# Patient Record
Sex: FEMALE | Race: WHITE | NOT HISPANIC OR LATINO | Employment: UNEMPLOYED | URBAN - METROPOLITAN AREA
[De-identification: names, ages, dates, MRNs, and addresses within clinical notes are randomized per-mention and may not be internally consistent; named-entity substitution may affect disease eponyms.]

---

## 2021-02-15 ENCOUNTER — OFFICE VISIT (OUTPATIENT)
Dept: AUDIOLOGY | Facility: CLINIC | Age: 66
End: 2021-02-15
Payer: MEDICARE

## 2021-02-15 DIAGNOSIS — R42 DIZZINESS: Primary | ICD-10-CM

## 2021-02-15 PROCEDURE — 92540 BASIC VESTIBULAR EVALUATION: CPT | Performed by: AUDIOLOGIST

## 2021-02-15 PROCEDURE — 92538 CALORIC VSTBLR TEST W/REC: CPT | Performed by: AUDIOLOGIST

## 2021-02-15 NOTE — PROGRESS NOTES
Videonystagmography (VNG) Evaluation    Name:  Bárbara Head  :  1955  Age:  78 y o  Date of Evaluation: 02/15/21     History: Dizziness  Reason for visit: Bárbara Amor is seen today at the request of Dr Trent Vergara for an evaluation of balance  Patient complains of on going dizziness that initially began ~1 year ago  Patient reports getting dizzy when getting up from a seated position or when walking  Patient describes her dizziness as if she is falling to the side and will need to grab onto something  She reports this typically lasts for several seconds at a time  Denies any true spinning vertigo  Patient denies any falls or head injuries/concussions/whiplash  Patient does report constant sinus related issues; denies any recent ear infections  Patient denies any fluctuations/changes with her hearing sensitives, tinnitus, or aural fullness  Patient reports her vision has worsened over the years and notes her last opthalmology appointment was in 2020  Patient denies any congenital diseases or nystagmus in her family  Medical history includes ovarian cancer in   Oculomotor battery:    Gaze:          Right: Normal        Left: Normal         Up: Normal        Down: Normal      Saccades: Normal     Tracking: Abnormal choppy tracings; square wave jerks present  Optokinetic: Abnormal Gain    Positioning/Positionals:     Rand Keron:    Right: Negative  , Up beating nystagmus present 3 a SPV  No torsion  Left: Negative  , Right beating nystagmus present 9 a SPV  No torsion  Clinically significant        Positionals:     Sitting: Normal    Supine: Abnormal Right Beat 3 degree and Clinically Not Significant    Head Right: Abnormal Left Beat 8 degree and Clinically Significant     Head Left: Abnormal Right Beat 9 degree and Clinically Significant     Body Right: Abnormal Left Beat 5 degree and Clinically Significant     Body Left[de-identified] Abnormal Right Beat 9 degree and Clinically Significant Calorics:     Bithermal Caloric Irrigation: Normal    Notes:  Abnormal central findings  Abnormal peripheral findings       Recommendations:  Consider vestibular physcial therapy evaluation and rehabilitation through [facility]  Continue medical work-up  Follow up with managing physician  Consider Neurological work-up        Francia Krishnamurthy , CCC-A  Clinical Audiologist

## 2021-04-18 NOTE — PROGRESS NOTES
Assessment and Plan:   Ms Mino Palacios is a 58-year-old female with history significant for primary generalized osteoarthritis and cervical degenerative arthritis, who presents for further evaluation of her joint pains  She is self-referred today  - Adma presents today for further evaluation of diffuse arthralgias she has been experiencing for the past 19 years following a course of chemotherapy for uterine cancer  She does not describe prominent inflammatory symptoms such as persistent joint swelling or prolonged morning stiffness and based on her examination today I have low suspicion for an inflammatory arthritis and suspect that her presentation is consistent with primary generalized osteoarthritis  I advised her in this case she will need to continue with Tylenol or NSAIDs as needed and follow up with Orthopedics as well as Pain Management to specifically address the left knee osteoarthritis and cervical degenerative arthritis, respectively  - She mentions having an inflammatory workup done in the past through her prior rheumatologist which was apparently unrevealing, but I would like to reestablish this on my own and will complete a workup as listed below  I will contact her with the results and determine if a rheumatology follow-up is indicated  Otherwise she is comfortable continuing follow-up with her primary care physician for the osteoarthritis  - She is requesting a baseline DEXA scan as this has not been done in a few years and she is postmenopausal   I provided her with a script today  Plan:  Diagnoses and all orders for this visit:    Diffuse arthralgia  -     CBC and differential; Future  -     Chronic Hepatitis Panel; Future  -     Comprehensive metabolic panel; Future  -     C-reactive protein; Future  -     Cyclic citrul peptide antibody, IgG; Future  -     RF Screen w/ Reflex to Titer; Future  -     Sedimentation rate, automated; Future  -     Sjogren's Antibodies;  Future  - Uric acid; Future  -     XR hand 3+ vw right; Future  -     XR hand 3+ vw left; Future  -     XR shoulder 2+ vw right; Future  -     XR shoulder 2+ vw left; Future    Primary generalized (osteo)arthritis    Osteoarthritis of cervical spine, unspecified spinal osteoarthritis complication status  -     Ambulatory referral to Pain Management; Future    Vitamin D deficiency  -     Vitamin D 25 hydroxy; Future    Postmenopausal  -     DXA bone density spine hip and pelvis; Future    Osteoporosis screening  -     DXA bone density spine hip and pelvis; Future    Encounter for screening for other viral diseases   -     Chronic Hepatitis Panel; Future    Other orders  -     Cancel: TSH, 3rd generation; Future  -     aspirin 81 mg chewable tablet; Chew 81 mg daily  -     meloxicam (MOBIC) 7 5 mg tablet; Take 7 5 mg by mouth daily  -     metFORMIN (GLUCOPHAGE) 500 mg tablet; Take 1 tablet by mouth 2 (two) times a day  -     metoprolol succinate (TOPROL-XL) 25 mg 24 hr tablet; Take 1 tablet by mouth daily  -     omeprazole (PriLOSEC) 20 mg delayed release capsule; TAKE 1 CAPSULE BY MOUTH EVERY DAY 30 MINUTES BEFORE MORNING MEAL  -     [employer] Saline Nasal [place] 0 65 % nasal spray; 2 SPRAYS EACH NARES 3 TIMES A DAY  -     triamcinolone (KENALOG) 0 1 % oral topical paste; Apply 1 application to teeth 2 (two) times a day  -     OneTouch Verio test strip; TEST BLOOD SUGAR ONCE DAILY, DX  E11 9      Activities as tolerated  Exercise: try to maintain a low impact exercise regimen as much as possible  Walk for 30 minutes a day for at least 3 days a week  Continue other medications as prescribed by PCP and other specialists  RTC PRN  HPI  Ms Damaris Nieves is a 25-year-old female with history significant for primary generalized osteoarthritis and cervical degenerative arthritis, who presents for further evaluation of her joint pains  She is self-referred today        Patient reports she has previously seen Rheumatology in [place] and had been monitored for a few years with follow-up blood work and x-rays, but there was no specific diagnosis offered to her  She has not been on any specific treatment either  She reports her joint pains started approximately in 2001 after completing a course of chemotherapy for uterine cancer  They have gradually progressed since then with additional joint involvement occurring over time  She describes most of her pain as affecting her hands, elbows, neck and shoulder region especially over the past 1 year, low back, outer aspects of her bilateral hips, knees (worse on the left side) and feet  No significant pain in her wrists or ankles  She has noticed swelling of her hands  She experiences morning stiffness at times that affects her hands and can take a few days to improve, usually resolving with intermittent fasting  She has tried over-the-counter Tylenol and NSAIDs as needed which does help to some degree but she has to take it at high doses  She was recently seen by Orthopedics for left knee osteoarthritis and prescribed a 1 week course of meloxicam which only helped about 3%  She is also known to have a diagnosis of cervical degenerative arthritis and recently had an MRI of her cervical spine done  She was referred to Neurology for this but no further recommendations were made  She has not been seen by Spine and Pain  She does report left eye dryness  She denies fevers, unintentional weight loss, persistent headaches (may experience this occasionally due to allergies), recent vision changes, jaw claudication, dry mouth, inflammatory eye disease, skin rash, psoriasis, inflammatory bowel disease or family history of autoimmune disease  Her brother has osteoarthritis      The following portions of the patient's history were reviewed and updated as appropriate: allergies, current medications, past family history, past medical history, past social history, past surgical history and problem list       Review of Systems  Constitutional: Negative for weight change, fevers, chills, night sweats, fatigue  ENT/Mouth: Negative for hearing changes, ear pain, nasal congestion, sinus pain, hoarseness, sore throat, rhinorrhea, swallowing difficulty  Eyes: Negative for pain, redness, discharge, vision changes  Cardiovascular: Negative for chest pain, SOB, palpitations  Respiratory: Negative for cough, sputum, wheezing, dyspnea  Gastrointestinal: Negative for nausea, vomiting, diarrhea, constipation, pain, heartburn  Genitourinary: Negative for dysuria, urinary frequency, hematuria  Musculoskeletal: As per HPI  Skin: Negative for skin rash, color changes  Neuro: Negative for weakness, numbness, tingling, loss of consciousness  Psych: Negative for anxiety, depression  Heme/Lymph: Negative for easy bruising, bleeding, lymphadenopathy          Past Medical History:   Diagnosis Date    Cancer [facility])        Past Surgical History:   Procedure Laterality Date    HYSTERECTOMY         Social History     Socioeconomic History    Marital status: Single     Spouse name: Not on file    Number of children: Not on file    Years of education: Not on file    Highest education level: Not on file   Occupational History    Not on file   Social Needs    Financial resource strain: Not on file    Food insecurity     Worry: Not on file     Inability: Not on file    Transportation needs     Medical: Not on file     Non-medical: Not on file   Tobacco Use    Smoking status: Never Smoker    Smokeless tobacco: Never Used   Substance and Sexual Activity    Alcohol use: Yes     Comment: occasional    Drug use: Never    Sexual activity: Not on file   Lifestyle    Physical activity     Days per week: Not on file     Minutes per session: Not on file    Stress: Not on file   Relationships    Social connections     Talks on phone: Not on file     Gets together: Not on file     Attends [employer] service: Not on file     Active member of club or organization: Not on file     Attends meetings of clubs or organizations: Not on file     Relationship status: Not on file    Intimate partner violence     Fear of current or ex partner: Not on file     Emotionally abused: Not on file     Physically abused: Not on file     Forced sexual activity: Not on file   Other Topics Concern    Not on file   Social History Narrative    Not on file       Family History   Problem Relation Age of Onset    Heart disease Mother     Lung disease Father     Heart disease Brother     Lung disease Brother        Allergies   Allergen Reactions    Shellfish Allergy - Food Allergy Other (See Comments)    Sulfa Antibiotics Other (See Comments)       Current Outpatient Medications:     CVS Saline Nasal [place] 0 65 % nasal spray, 2 SPRAYS EACH NARES 3 TIMES A DAY, Disp: , Rfl:     metFORMIN (GLUCOPHAGE) 500 mg tablet, Take 1 tablet by mouth 2 (two) times a day, Disp: , Rfl:     aspirin 81 mg chewable tablet, Chew 81 mg daily, Disp: , Rfl:     meloxicam (MOBIC) 7 5 mg tablet, Take 7 5 mg by mouth daily, Disp: , Rfl:     metoprolol succinate (TOPROL-XL) 25 mg 24 hr tablet, Take 1 tablet by mouth daily, Disp: , Rfl:     omeprazole (PriLOSEC) 20 mg delayed release capsule, TAKE 1 CAPSULE BY MOUTH EVERY DAY 30 MINUTES BEFORE MORNING MEAL, Disp: , Rfl:     OneTouch Verio test strip, TEST BLOOD SUGAR ONCE DAILY, DX  E11 9, Disp: , Rfl:     triamcinolone (KENALOG) 0 1 % oral topical paste, Apply 1 application to teeth 2 (two) times a day, Disp: , Rfl:       Objective:    Vitals:    04/19/21 1253   BP: 147/83   BP Location: Left arm   Patient Position: Sitting   Cuff Size: Adult   Pulse: 85   Temp: 98 4 °F (36 9 °C)   Weight: 63 5 kg (140 lb)       Physical Exam  General: Well appearing, well nourished, in no distress  Oriented x 3, normal mood and affect  Ambulating without difficulty    Skin: Good turgor, no rash, unusual bruising or prominent lesions  Hair: Normal texture and distribution  Nails: Normal color, no deformities  HEENT:  Head: Normocephalic, atraumatic  Eyes: Conjunctiva clear, sclera non-icteric, EOM intact  Extremities: No amputations or deformities, cyanosis, edema  Prominent lower extremity varicose veins noted  Musculoskeletal:   Hands - she has significant osteoarthritic changes present at her bilateral DIP and PIP joints but there is no soft tissue swelling or tenderness noted  Her MCPs are unremarkable  She does have significant bony enlargement noted at multiple digits bilaterally with an overall enlarged appearance to her fingers  She is unable to fully extend at her right hand 3rd and 4th digits  Wrists, elbows and shoulders - unremarkable  Knees, ankles and feet - unremarkable except for bunion deformities noted  Negative fibromyalgia tender points  Neurologic: Alert and oriented  No focal neurological deficits appreciated  Psychiatric: Normal mood and affect  KENNETH Marte    Rheumatology

## 2021-04-19 ENCOUNTER — LAB (OUTPATIENT)
Dept: LAB | Facility: HOSPITAL | Age: 66
End: 2021-04-19
Attending: INTERNAL MEDICINE
Payer: MEDICARE

## 2021-04-19 ENCOUNTER — HOSPITAL ENCOUNTER (OUTPATIENT)
Dept: RADIOLOGY | Facility: HOSPITAL | Age: 66
Discharge: HOME | End: 2021-04-19
Attending: INTERNAL MEDICINE
Payer: MEDICARE

## 2021-04-19 ENCOUNTER — TRANSCRIBE ORDERS (OUTPATIENT)
Dept: ADMINISTRATIVE | Facility: HOSPITAL | Age: 66
End: 2021-04-19

## 2021-04-19 ENCOUNTER — OFFICE VISIT (OUTPATIENT)
Dept: RHEUMATOLOGY | Facility: CLINIC | Age: 66
End: 2021-04-19
Payer: MEDICARE

## 2021-04-19 VITALS
SYSTOLIC BLOOD PRESSURE: 147 MMHG | WEIGHT: 140 LBS | DIASTOLIC BLOOD PRESSURE: 83 MMHG | HEART RATE: 85 BPM | TEMPERATURE: 98.4 F

## 2021-04-19 DIAGNOSIS — M25.50 DIFFUSE ARTHRALGIA: Primary | ICD-10-CM

## 2021-04-19 DIAGNOSIS — M25.50 DIFFUSE ARTHRALGIA: ICD-10-CM

## 2021-04-19 DIAGNOSIS — E55.9 VITAMIN D DEFICIENCY: ICD-10-CM

## 2021-04-19 DIAGNOSIS — Z78.0 POSTMENOPAUSAL: ICD-10-CM

## 2021-04-19 DIAGNOSIS — Z11.59 ENCOUNTER FOR SCREENING FOR OTHER VIRAL DISEASES: ICD-10-CM

## 2021-04-19 DIAGNOSIS — Z13.820 OSTEOPOROSIS SCREENING: ICD-10-CM

## 2021-04-19 DIAGNOSIS — M47.812 OSTEOARTHRITIS OF CERVICAL SPINE, UNSPECIFIED SPINAL OSTEOARTHRITIS COMPLICATION STATUS: ICD-10-CM

## 2021-04-19 DIAGNOSIS — M15.0 PRIMARY GENERALIZED (OSTEO)ARTHRITIS: ICD-10-CM

## 2021-04-19 PROCEDURE — 99204 OFFICE O/P NEW MOD 45 MIN: CPT | Performed by: INTERNAL MEDICINE

## 2021-04-19 PROCEDURE — 73130 X-RAY EXAM OF HAND: CPT

## 2021-04-19 PROCEDURE — 73030 X-RAY EXAM OF SHOULDER: CPT

## 2021-04-19 RX ORDER — ASPIRIN 81 MG/1
81 TABLET, CHEWABLE ORAL DAILY
COMMUNITY

## 2021-04-19 RX ORDER — MELOXICAM 7.5 MG/1
7.5 TABLET ORAL DAILY
COMMUNITY
Start: 2021-04-05 | End: 2021-05-05

## 2021-04-19 RX ORDER — TRIAMCINOLONE ACETONIDE 0.1 %
1 PASTE (GRAM) DENTAL 2 TIMES DAILY
COMMUNITY

## 2021-04-19 RX ORDER — METOPROLOL SUCCINATE 25 MG/1
1 TABLET, EXTENDED RELEASE ORAL DAILY
COMMUNITY

## 2021-04-19 RX ORDER — OMEPRAZOLE 20 MG/1
CAPSULE, DELAYED RELEASE ORAL
COMMUNITY
Start: 2021-02-06

## 2021-04-19 RX ORDER — SODIUM CHLORIDE 0.65 %
AEROSOL, SPRAY (ML) NASAL
COMMUNITY
Start: 2021-03-17

## 2021-04-19 RX ORDER — BLOOD SUGAR DIAGNOSTIC
STRIP MISCELLANEOUS
COMMUNITY
Start: 2021-02-09

## 2021-04-21 ENCOUNTER — TELEPHONE (OUTPATIENT)
Dept: OBGYN CLINIC | Facility: HOSPITAL | Age: 66
End: 2021-04-21

## 2021-04-21 NOTE — TELEPHONE ENCOUNTER
Dr Yolette Arzola cover the Hepatitis and Vitamin D bloodwork  The doctor has to code it differently showing that the test is necessary        # [phone]

## 2021-04-21 NOTE — TELEPHONE ENCOUNTER
Please look for code for screening hepatitis for the hepatitis test and for Vitamin D deficiency for the Vitamin D test, thanks

## 2021-04-29 ENCOUNTER — HOSPITAL ENCOUNTER (OUTPATIENT)
Dept: RADIOLOGY | Facility: HOSPITAL | Age: 66
Discharge: HOME | End: 2021-04-29
Attending: INTERNAL MEDICINE
Payer: MEDICARE

## 2021-04-29 ENCOUNTER — TRANSCRIBE ORDERS (OUTPATIENT)
Dept: ADMINISTRATIVE | Facility: HOSPITAL | Age: 66
End: 2021-04-29

## 2021-04-29 ENCOUNTER — APPOINTMENT (OUTPATIENT)
Dept: LAB | Facility: HOSPITAL | Age: 66
End: 2021-04-29
Payer: MEDICARE

## 2021-04-29 DIAGNOSIS — Z13.820 OSTEOPOROSIS SCREENING: ICD-10-CM

## 2021-04-29 DIAGNOSIS — Z78.0 POSTMENOPAUSAL: ICD-10-CM

## 2021-04-29 LAB
25(OH)D3 SERPL-MCNC: >135 NG/ML (ref 30–100)
ALBUMIN SERPL BCP-MCNC: 3.9 G/DL (ref 3.5–5)
ALP SERPL-CCNC: 91 U/L (ref 46–116)
ALT SERPL W P-5'-P-CCNC: 31 U/L (ref 12–78)
ANION GAP SERPL CALCULATED.3IONS-SCNC: 9 MMOL/L (ref 4–13)
AST SERPL W P-5'-P-CCNC: 19 U/L (ref 5–45)
BASOPHILS # BLD AUTO: 0.01 THOUSANDS/ΜL (ref 0–0.1)
BASOPHILS NFR BLD AUTO: 0 % (ref 0–1)
BILIRUB SERPL-MCNC: 0.38 MG/DL (ref 0.2–1)
BUN SERPL-MCNC: 19 MG/DL (ref 5–25)
CALCIUM SERPL-MCNC: 8.4 MG/DL (ref 8.3–10.1)
CHLORIDE SERPL-SCNC: 105 MMOL/L (ref 100–108)
CO2 SERPL-SCNC: 29 MMOL/L (ref 21–32)
CREAT SERPL-MCNC: 0.68 MG/DL (ref 0.6–1.3)
CRP SERPL QL: <0.5 MG/L
EOSINOPHIL # BLD AUTO: 0.21 THOUSAND/ΜL (ref 0–0.61)
EOSINOPHIL NFR BLD AUTO: 3 % (ref 0–6)
ERYTHROCYTE [DISTWIDTH] IN BLOOD BY AUTOMATED COUNT: 12.8 % (ref 11.6–15.1)
ERYTHROCYTE [SEDIMENTATION RATE] IN BLOOD: 10 MM/HOUR (ref 0–29)
GFR SERPL CREATININE-BSD FRML MDRD: 91 ML/MIN/1.73SQ M
GLUCOSE SERPL-MCNC: 117 MG/DL (ref 65–140)
HBV CORE AB SER QL: NORMAL
HBV CORE IGM SER QL: NORMAL
HBV SURFACE AG SER QL: NORMAL
HCT VFR BLD AUTO: 38.1 % (ref 34.8–46.1)
HCV AB SER QL: NORMAL
HGB BLD-MCNC: 12.9 G/DL (ref 11.5–15.4)
IMM GRANULOCYTES # BLD AUTO: 0.01 THOUSAND/UL (ref 0–0.2)
IMM GRANULOCYTES NFR BLD AUTO: 0 % (ref 0–2)
LYMPHOCYTES # BLD AUTO: 2.5 THOUSANDS/ΜL (ref 0.6–4.47)
LYMPHOCYTES NFR BLD AUTO: 41 % (ref 14–44)
MCH RBC QN AUTO: 30.9 PG (ref 26.8–34.3)
MCHC RBC AUTO-ENTMCNC: 33.9 G/DL (ref 31.4–37.4)
MCV RBC AUTO: 91 FL (ref 82–98)
MONOCYTES # BLD AUTO: 0.39 THOUSAND/ΜL (ref 0.17–1.22)
MONOCYTES NFR BLD AUTO: 6 % (ref 4–12)
NEUTROPHILS # BLD AUTO: 2.99 THOUSANDS/ΜL (ref 1.85–7.62)
NEUTS SEG NFR BLD AUTO: 50 % (ref 43–75)
NRBC BLD AUTO-RTO: 0 /100 WBCS
PLATELET # BLD AUTO: 222 THOUSANDS/UL (ref 149–390)
PMV BLD AUTO: 10.9 FL (ref 8.9–12.7)
POTASSIUM SERPL-SCNC: 3.9 MMOL/L (ref 3.5–5.3)
PROT SERPL-MCNC: 7.7 G/DL (ref 6.4–8.2)
RBC # BLD AUTO: 4.17 MILLION/UL (ref 3.81–5.12)
SODIUM SERPL-SCNC: 143 MMOL/L (ref 136–145)
URATE SERPL-MCNC: 4.7 MG/DL (ref 2–6.8)
WBC # BLD AUTO: 6.11 THOUSAND/UL (ref 4.31–10.16)

## 2021-04-29 PROCEDURE — 86704 HEP B CORE ANTIBODY TOTAL: CPT

## 2021-04-29 PROCEDURE — 87340 HEPATITIS B SURFACE AG IA: CPT

## 2021-04-29 PROCEDURE — 86803 HEPATITIS C AB TEST: CPT

## 2021-04-29 PROCEDURE — 84550 ASSAY OF BLOOD/URIC ACID: CPT

## 2021-04-29 PROCEDURE — 80053 COMPREHEN METABOLIC PANEL: CPT

## 2021-04-29 PROCEDURE — 86235 NUCLEAR ANTIGEN ANTIBODY: CPT

## 2021-04-29 PROCEDURE — 86140 C-REACTIVE PROTEIN: CPT

## 2021-04-29 PROCEDURE — 36415 COLL VENOUS BLD VENIPUNCTURE: CPT

## 2021-04-29 PROCEDURE — 86430 RHEUMATOID FACTOR TEST QUAL: CPT

## 2021-04-29 PROCEDURE — 77080 DXA BONE DENSITY AXIAL: CPT

## 2021-04-29 PROCEDURE — 86200 CCP ANTIBODY: CPT

## 2021-04-29 PROCEDURE — 85652 RBC SED RATE AUTOMATED: CPT

## 2021-04-29 PROCEDURE — 85025 COMPLETE CBC W/AUTO DIFF WBC: CPT

## 2021-04-29 PROCEDURE — 82306 VITAMIN D 25 HYDROXY: CPT

## 2021-04-29 PROCEDURE — 86705 HEP B CORE ANTIBODY IGM: CPT

## 2021-04-30 LAB
CCP AB SER IA-ACNC: 0.5
RHEUMATOID FACT SER QL LA: NEGATIVE

## 2021-05-01 LAB
ENA SS-A AB SER-ACNC: <0.2 AI (ref 0–0.9)
ENA SS-B AB SER-ACNC: <0.2 AI (ref 0–0.9)

## 2021-05-03 ENCOUNTER — TELEPHONE (OUTPATIENT)
Dept: RHEUMATOLOGY | Facility: CLINIC | Age: 66
End: 2021-05-03

## 2021-05-03 NOTE — TELEPHONE ENCOUNTER
----- Message from Sherlyn Aktins MD sent at 5/1/2021  6:19 PM EDT -----  Please let her know the xrays showed wear and tear arthritis (no findings of RA) and her diagnosis is osteoarthritis  The autoimmune labs are all normal  Her Vitamin D levels are very high and she should stop taking any Vitamin D supplements  The bone density scan shows osteopenia and she should follow up with her PCP for this  She does not need a rheum follow up, thanks

## 2021-05-27 ENCOUNTER — TELEPHONE (OUTPATIENT)
Dept: RHEUMATOLOGY | Facility: CLINIC | Age: 66
End: 2021-05-27

## 2021-06-10 ENCOUNTER — TRANSCRIBE ORDERS (OUTPATIENT)
Dept: ADMINISTRATIVE | Facility: HOSPITAL | Age: 66
End: 2021-06-10

## 2021-06-10 DIAGNOSIS — R56.9 UNSPECIFIED CONVULSIONS (HCC): Primary | ICD-10-CM

## 2023-02-13 ENCOUNTER — PROBLEM (OUTPATIENT)
Dept: URBAN - METROPOLITAN AREA CLINIC 96 | Facility: CLINIC | Age: 68
End: 2023-02-13

## 2023-02-13 DIAGNOSIS — H43.393: ICD-10-CM

## 2023-02-13 DIAGNOSIS — H35.431: ICD-10-CM

## 2023-02-13 DIAGNOSIS — H35.013: ICD-10-CM

## 2023-02-13 DIAGNOSIS — E11.9: ICD-10-CM

## 2023-02-13 PROCEDURE — 92250 FUNDUS PHOTOGRAPHY W/I&R: CPT

## 2023-02-13 PROCEDURE — 92134 CPTRZ OPH DX IMG PST SGM RTA: CPT

## 2023-02-13 PROCEDURE — 92201 OPSCPY EXTND RTA DRAW UNI/BI: CPT

## 2023-02-13 PROCEDURE — 99204 OFFICE O/P NEW MOD 45 MIN: CPT

## 2023-02-13 ASSESSMENT — VISUAL ACUITY
OD_SC: 20/50-2
OS_SC: 20/30+3
OD_PH: 20/20-1

## 2023-02-13 ASSESSMENT — TONOMETRY
OS_IOP_MMHG: 15
OD_IOP_MMHG: 13

## 2023-03-10 ENCOUNTER — OFFICE VISIT (OUTPATIENT)
Dept: OBGYN CLINIC | Facility: CLINIC | Age: 68
End: 2023-03-10

## 2023-03-10 ENCOUNTER — APPOINTMENT (OUTPATIENT)
Dept: RADIOLOGY | Facility: CLINIC | Age: 68
End: 2023-03-10

## 2023-03-10 VITALS
HEART RATE: 75 BPM | WEIGHT: 140 LBS | BODY MASS INDEX: 27.48 KG/M2 | SYSTOLIC BLOOD PRESSURE: 169 MMHG | DIASTOLIC BLOOD PRESSURE: 81 MMHG | HEIGHT: 60 IN

## 2023-03-10 DIAGNOSIS — M70.62 TROCHANTERIC BURSITIS OF LEFT HIP: Primary | ICD-10-CM

## 2023-03-10 DIAGNOSIS — M25.552 PAIN IN LEFT HIP: ICD-10-CM

## 2023-03-10 DIAGNOSIS — M54.16 RADICULOPATHY, LUMBAR REGION: ICD-10-CM

## 2023-03-10 RX ORDER — AZELASTINE 1 MG/ML
1 SPRAY, METERED NASAL
COMMUNITY

## 2023-03-10 RX ORDER — FLUTICASONE PROPIONATE 50 MCG
1 SPRAY, SUSPENSION (ML) NASAL
COMMUNITY

## 2023-03-10 NOTE — PROGRESS NOTES
Assessment/Plan:  1  Trochanteric bursitis of left hip  XR hip/pelv 2-3 vws left if performed    Ambulatory referral to Physical Therapy      2  Radiculopathy, lumbar region  Ambulatory referral to Physical Therapy          Bárbara has left-sided hip pain consistent with trochanteric bursitis  She has mild symptoms in her lumbar spine with potentially some sciatic symptoms however these are not very severe  I cannot reproduce radicular symptoms with straight leg raising however she may have some discomfort into the buttock region and hip associated with her lower back  I discussed with her treatment options including home exercises, ice, anti-inflammatories and formal physical therapy  She will undergo conservative measures first and follow-up in the office in 6 weeks if the pain persists or worsens  Subjective:   Bárbara Dominguez is a 70 y o  female who presents to the office for evaluation for left-sided hip pain  She states that she injured her hip about 10 days ago and was assembling furniture and sitting in an awkward position  This created increased pain in her back and lateral hip  She denies any fall or trauma  She has been feeling increased pain in the lateral hip rating down the leg  It comes and goes but seems to be mostly on the outer portion of her hip occasionally it does radiate into the groin region  Nuys any history of hip issues in the past   She is very active and likes to hike and even tries to run for exercise  Review of Systems   Constitutional: Negative for chills, fever and unexpected weight change  HENT: Negative for hearing loss, nosebleeds and sore throat  Eyes: Negative for pain, redness and visual disturbance  Respiratory: Negative for cough, shortness of breath and wheezing  Cardiovascular: Negative for chest pain, palpitations and leg swelling  Gastrointestinal: Negative for abdominal pain, nausea and vomiting  Endocrine: Negative for polydipsia and polyuria  Genitourinary: Negative for dysuria and hematuria  Musculoskeletal:        See HPI   Skin: Negative for rash and wound  Neurological: Negative for dizziness, numbness and headaches  Psychiatric/Behavioral: Negative for decreased concentration and suicidal ideas  The patient is not nervous/anxious            Past Medical History:   Diagnosis Date   • Cancer [facility])        Past Surgical History:   Procedure Laterality Date   • HYSTERECTOMY         Family History   Problem Relation Age of Onset   • Heart disease Mother    • Lung disease Father    • Heart disease Brother    • Lung disease Brother        Social History     Occupational History   • Not on file   Tobacco Use   • Smoking status: Never   • Smokeless tobacco: Never   Vaping Use   • Vaping Use: Never used   Substance and Sexual Activity   • Alcohol use: Yes     Comment: occasional   • Drug use: Never   • Sexual activity: Not on file         Current Outpatient Medications:   •  metFORMIN (GLUCOPHAGE) 500 mg tablet, Take 1 tablet by mouth 2 (two) times a day, Disp: , Rfl:   •  OneTouch Verio test strip, TEST BLOOD SUGAR ONCE DAILY, DX  E11 9, Disp: , Rfl:   •  aspirin 81 mg chewable tablet, Chew 81 mg daily, Disp: , Rfl:   •  azelastine (ASTELIN) 0 1 % nasal spray, 1 spray into each nostril (Patient not taking: Reported on 3/10/2023), Disp: , Rfl:   •  CVS Saline Nasal [place] 0 65 % nasal spray, 2 SPRAYS EACH NARES 3 TIMES A DAY, Disp: , Rfl:   •  fluticasone (FLONASE) 50 mcg/act nasal spray, 1 spray into each nostril, Disp: , Rfl:   •  meloxicam (MOBIC) 7 5 mg tablet, Take 7 5 mg by mouth daily, Disp: , Rfl:   •  metoprolol succinate (TOPROL-XL) 25 mg 24 hr tablet, Take 1 tablet by mouth daily, Disp: , Rfl:   •  omeprazole (PriLOSEC) 20 mg delayed release capsule, TAKE 1 CAPSULE BY MOUTH EVERY DAY 30 MINUTES BEFORE MORNING MEAL, Disp: , Rfl:   •  triamcinolone (KENALOG) 0 1 % oral topical paste, Apply 1 application to teeth 2 (two) times a day, Disp: , Rfl: Allergies   Allergen Reactions   • Sulfa Antibiotics Other (See Comments), Anaphylaxis and Swelling     Other reaction(s): Other (See Comments)     • Shellfish Allergy - Food Allergy Other (See Comments)       Objective:  Vitals:    03/10/23 1448   BP: 169/81   Pulse: 75       Left Hip Exam     Tenderness   The patient is experiencing tenderness in the greater trochanter  Range of Motion   External rotation: normal   Internal rotation: normal     Tests   DESTINI: negative    Other   Erythema: absent  Sensation: normal  Pulse: present      Back Exam     Tenderness   The patient is experiencing tenderness in the lumbar  Range of Motion   Extension: normal     Muscle Strength   Right Quadriceps:  5/5   Left Quadriceps:  5/5   Right Hamstrings:  5/5   Left Hamstrings:  5/5     Tests   Straight leg raise right: negative  Straight leg raise left: negative    Reflexes   Patellar: normal    Other   Toe walk: normal  Heel walk: normal  Gait: normal   Erythema: no back redness            Physical Exam  Vitals and nursing note reviewed  Constitutional:       Appearance: Normal appearance  She is well-developed  HENT:      Head: Normocephalic and atraumatic  Right Ear: External ear normal       Left Ear: External ear normal    Eyes:      General: No scleral icterus  Extraocular Movements: Extraocular movements intact  Conjunctiva/sclera: Conjunctivae normal    Cardiovascular:      Rate and Rhythm: Normal rate  Pulmonary:      Effort: Pulmonary effort is normal  No respiratory distress  Musculoskeletal:      Cervical back: Normal range of motion and neck supple  Lumbar back: Negative right straight leg raise test and negative left straight leg raise test       Comments: See Ortho exam   Skin:     General: Skin is warm and dry  Neurological:      Mental Status: She is alert and oriented to person, place, and time     Psychiatric:         Behavior: Behavior normal          I have personally reviewed pertinent films in PACS and my interpretation is as follows:  X-rays of the left hip demonstrate no evidence of acute fracture or significant degenerative change      This document was created using speech voice recognition software  Grammatical errors, random word insertions, pronoun errors, and incomplete sentences are an occasional consequence of this system due to software limitations, ambient noise, and hardware issues  Any formal questions or concerns about content, text, or information contained within the body of this dictation should be directly addressed to the provider for clarification

## 2023-03-13 ENCOUNTER — EVALUATION (OUTPATIENT)
Dept: PHYSICAL THERAPY | Facility: CLINIC | Age: 68
End: 2023-03-13

## 2023-03-13 DIAGNOSIS — M70.62 TROCHANTERIC BURSITIS OF LEFT HIP: ICD-10-CM

## 2023-03-13 DIAGNOSIS — M54.16 RADICULOPATHY, LUMBAR REGION: ICD-10-CM

## 2023-03-13 NOTE — PROGRESS NOTES
PT Evaluation     Today's date: 3/13/2023  Patient name: Bárbara Head  : 1955  MRN: [id number]  Referring provider: Kaleigh Viera DO  Dx:   Encounter Diagnosis     ICD-10-CM    1  Trochanteric bursitis of left hip  M70 62 Ambulatory referral to Physical Therapy      2  Radiculopathy, lumbar region  M54 16 Ambulatory referral to Physical Therapy                     Assessment  Assessment details: Bárbara Grewalresents with signs and symptoms consistent with Trochanteric bursitis of left hip  Radiculopathy, lumbar region, with loss of range of motion, strength and spinal stabilization  Presents with high reactivity  Bárbara Head would benefit with physical therapy to address these impairments to return to prior level of function  Impairments: abnormal gait, abnormal or restricted ROM, activity intolerance, impaired physical strength, lacks appropriate home exercise program and pain with function  Understanding of Dx/Px/POC: good   Prognosis: good    Goals  STG  Initiate HEP  Able to decrease pain by 75% in 2 weeks  LTG  Independent with HEP  ROM to full  Strength to 5/5  FOTO > 45 in 6 weeks      Plan  Planned therapy interventions: joint mobilization, manual therapy, neuromuscular re-education, strengthening, stretching, therapeutic exercise, home exercise program and functional ROM exercises  Frequency: 2x week  Duration in visits: 12  Duration in weeks: 6  Treatment plan discussed with: patient        Subjective Evaluation    History of Present Illness  Mechanism of injury: Patient reports walking with weights on ankle 2 5 lb walked for 20 mins uphill and carrying 8 lbs in each arm  This was the first workout  She developed left hip pain  She was sitting putting together exercise equiopment in an akward position  Recurrent probem    Quality of life: good          Objective     Tenderness     Left Hip   Tenderness in the greater trochanter       Right Hip   No tenderness in the greater trochanter       Active Range of Motion   Left Hip   Flexion: 115 degrees with pain  Abduction: 30 degrees with pain  External rotation (90/90): 35 degrees   Internal rotation (90/90): 25 degrees with pain    Right Hip   Flexion: 120 degrees   Abduction: 40 degrees   External rotation (90/90): 40 degrees   Internal rotation (90/90): 30 degrees     Strength/Myotome Testing     Left Hip   Planes of Motion   Flexion: 4-  Abduction: 4-  External rotation: 3+  Internal rotation: 3+    Right Hip   Planes of Motion   Flexion: 5  Abduction: 5  External rotation: 5  Internal rotation: 5             Precautions: Standard      Manuals                                                                 Neuro Re-Ed                                                                                                        Ther Ex                                                                                                                     Ther Activity                                       Gait Training                                       Modalities

## 2023-03-16 ENCOUNTER — OFFICE VISIT (OUTPATIENT)
Dept: PHYSICAL THERAPY | Facility: CLINIC | Age: 68
End: 2023-03-16

## 2023-03-16 DIAGNOSIS — M54.16 RADICULOPATHY, LUMBAR REGION: ICD-10-CM

## 2023-03-16 DIAGNOSIS — M70.62 TROCHANTERIC BURSITIS OF LEFT HIP: Primary | ICD-10-CM

## 2023-03-16 NOTE — PROGRESS NOTES
Daily Note     Today's date: 3/16/2023  Patient name: Bárbara Head  : 1955  MRN: [id number]  Referring provider: Carol Banegas DO  Dx:   Encounter Diagnosis     ICD-10-CM    1  Trochanteric bursitis of left hip  M70 62       2  Radiculopathy, lumbar region  M54 16                      Subjective: Pre[place]: I have left hip pain  Post-[place]: I have 90% less pain  Objective: See treatment diary below      Assessment: Tolerated treatment well  Patient demonstrated fatigue post treatment and exhibited good technique with therapeutic exercises      Plan: Continue per plan of care        Precautions: Standard      Manuals                                                                 Neuro Re-Ed 3/16            Neurac supine pelvic lift 2x5            Neurac bridge 2x5            Neurac SDLY hip ADD/ABD 2x5  2x5            Neurac scap retractsupine 2x5            Neurac cerv retact supine 2x5            Neurac Scap retract w depression sit 2x5                         Ther Ex             Hip ADD w Ball squeeze 20x            PB Bridge bilat to unilat 20x  10x            PB trunk rot 20x                                                                             Ther Activity                                       Gait Training                                       Modalities

## 2023-03-20 ENCOUNTER — OFFICE VISIT (OUTPATIENT)
Dept: PHYSICAL THERAPY | Facility: CLINIC | Age: 68
End: 2023-03-20

## 2023-03-20 DIAGNOSIS — M70.62 TROCHANTERIC BURSITIS OF LEFT HIP: Primary | ICD-10-CM

## 2023-03-20 DIAGNOSIS — M54.16 RADICULOPATHY, LUMBAR REGION: ICD-10-CM

## 2023-03-20 NOTE — PROGRESS NOTES
Daily Note     Today's date: 3/20/2023  Patient name: Bárbara Head  : 1955  MRN: [id number]  Referring provider: Lynda Esqueda DO  Dx:   Encounter Diagnosis     ICD-10-CM    1  Trochanteric bursitis of left hip  M70 62       2  Radiculopathy, lumbar region  M54 16                      Subjective: My hip pain is at night   Objective: See treatment diary below      Assessment: Tolerated treatment well  Patient demonstrated fatigue post treatment and exhibited good technique with therapeutic exercises      Plan: Continue per plan of care        Precautions: Standard      Manuals                                                                 Neuro Re-Ed 3/16 3/20           Neurac supine pelvic lift 2x5 2x5           Neurac bridge 2x5 2x5           Neurac SDLY hip ADD/ABD 2x5  2x5 2x5  2x5           Neurac scap retractsupine 2x5 2x5           Neurac cerv retact supine 2x5 2x5           Neurac Scap retract w depression sit 2x5 2x5                        Ther Ex             Hip ADD w Ball squeeze 20x 20x           PB Bridge bilat to unilat 20x  10x 20x  20x           PB trunk rot 20x 20x           FSU/LSU  6" 20x           Resisted Lunge  RTB 20x                                                  Ther Activity                                       Gait Training                                       Modalities

## 2023-03-22 ENCOUNTER — OFFICE VISIT (OUTPATIENT)
Dept: PHYSICAL THERAPY | Facility: CLINIC | Age: 68
End: 2023-03-22

## 2023-03-22 DIAGNOSIS — M70.62 TROCHANTERIC BURSITIS OF LEFT HIP: Primary | ICD-10-CM

## 2023-03-22 NOTE — PROGRESS NOTES
Daily Note     Today's date: 3/22/2023  Patient name: Bárbara Head  : 1955  MRN: [id number]  Referring provider: Kylah Duncan DO  Dx:   Encounter Diagnosis     ICD-10-CM    1  Trochanteric bursitis of left hip  M70 62                      Subjective: My primary complaint is tightness in the left hip with bending and twisting to the right  Overall, I feel and walk much better  Objective: See treatment diary below      Assessment: Tolerated treatment well  Patient demonstrated fatigue post treatment and exhibited good technique with therapeutic exercises      Plan: Continue per plan of care        Precautions: Standard      Manuals                                                                 Neuro Re-Ed 3/16 3/20 3/22          Neurac supine pelvic lift 2x5 2x5 2x5          Neurac bridge 2x5 2x5 2x5          Neurac SDLY hip ADD/ABD 2x5  2x5 2x5  2x5 2x5  2x5          Neurac scap retractsupine 2x5 2x5 2x5          Neurac cerv retact supine 2x5 2x5 2x5          Neurac Scap retract w depression sit 2x5 2x5 2x5                       Ther Ex             Hip ADD w Ball squeeze 20x 20x           PB Bridge bilat to unilat 20x  10x 20x  20x           PB trunk rot 20x 20x           FSU/LSU  6" 20x           Resisted Lunge  RTB 20x                                                  Ther Activity                                       Gait Training                                       Modalities

## 2023-03-27 ENCOUNTER — OFFICE VISIT (OUTPATIENT)
Dept: PHYSICAL THERAPY | Facility: CLINIC | Age: 68
End: 2023-03-27

## 2023-03-27 DIAGNOSIS — M54.16 RADICULOPATHY, LUMBAR REGION: ICD-10-CM

## 2023-03-27 DIAGNOSIS — M70.62 TROCHANTERIC BURSITIS OF LEFT HIP: Primary | ICD-10-CM

## 2023-03-27 NOTE — PROGRESS NOTES
"Daily Note     Today's date: 3/27/2023  Patient name: Bárbara Head  : 1955  MRN: [id number]  Referring provider: Miguel Angel Diez DO  Dx:   Encounter Diagnosis     ICD-10-CM    1  Trochanteric bursitis of left hip  M70 62       2  Radiculopathy, lumbar region  M54 16                      Subjective: Overall less pain, able to control the pain with HEP  Objective: See treatment diary below      Assessment: Tolerated treatment well  Patient demonstrated fatigue post treatment and exhibited good technique with therapeutic exercises      Plan: Continue per plan of care        Precautions: Standard      Manuals                                                                 Neuro Re-Ed 3/16 3/20 3/22 3/27         Neurac supine pelvic lift 2x5 2x5 2x5 2x5         Neurac bridge 2x5 2x5 2x5 2x5         Neurac SDLY hip ADD/ABD 2x5  2x5 2x5  2x5 2x5  2x5 2x5  2x5         Neurac scap retractsupine 2x5 2x5 2x5 2x5         Neurac cerv retact supine 2x5 2x5 2x5 2x5         Neurac Scap retract w depression sit 2x5 2x5 2x5 2x5                      Ther Ex             Hip ADD w Ball squeeze 20x 20x           PB Bridge bilat to unilat 20x  10x 20x  20x           PB trunk rot 20x 20x           FSU/LSU  6\" 20x           Resisted Lunge  RTB 20x                                                  Ther Activity                                       Gait Training                                       Modalities                                            "

## 2023-03-30 ENCOUNTER — APPOINTMENT (OUTPATIENT)
Dept: PHYSICAL THERAPY | Facility: CLINIC | Age: 68
End: 2023-03-30

## 2023-04-13 ENCOUNTER — FOLLOW UP (OUTPATIENT)
Dept: URBAN - METROPOLITAN AREA CLINIC 14 | Facility: CLINIC | Age: 68
End: 2023-04-13

## 2023-04-13 DIAGNOSIS — E11.9: ICD-10-CM

## 2023-04-13 DIAGNOSIS — H43.393: ICD-10-CM

## 2023-04-13 DIAGNOSIS — H35.431: ICD-10-CM

## 2023-04-13 DIAGNOSIS — H35.013: ICD-10-CM

## 2023-04-13 PROCEDURE — 92134 CPTRZ OPH DX IMG PST SGM RTA: CPT

## 2023-04-13 PROCEDURE — 92014 COMPRE OPH EXAM EST PT 1/>: CPT

## 2023-04-13 PROCEDURE — 92202 OPSCPY EXTND ON/MAC DRAW: CPT

## 2023-04-13 ASSESSMENT — VISUAL ACUITY
OS_SC: 20/30
OS_PH: 20/25+1
OD_SC: 20/25-1

## 2023-04-13 ASSESSMENT — TONOMETRY
OS_IOP_MMHG: 15
OD_IOP_MMHG: 14

## 2023-07-13 ENCOUNTER — OFFICE VISIT (OUTPATIENT)
Dept: PHYSICAL THERAPY | Facility: CLINIC | Age: 68
End: 2023-07-13
Payer: MEDICARE

## 2023-07-13 DIAGNOSIS — M54.16 LUMBAR RADICULOPATHY: Primary | ICD-10-CM

## 2023-07-13 PROCEDURE — 97161 PT EVAL LOW COMPLEX 20 MIN: CPT | Performed by: PHYSICAL THERAPIST

## 2023-07-13 NOTE — PROGRESS NOTES
PT Evaluation     Today's date: 2023  Patient name: Bárbara Haed  : 1955  MRN: [id number]  Referring provider: Aleisha Shaffer DO  Dx:   Encounter Diagnosis     ICD-10-CM    1. Lumbar radiculopathy  M54.16                      Assessment  Assessment details: Bárbara Grewalresents with signs and symptoms consistent with Lumbar radiculopathy  (primary encounter diagnosis), with loss of range of motion, strength and spinal stabilization. Presents with high reactivity. Bárbara Head would benefit with physical therapy to address these impairments to return to prior level of function. Impairments: abnormal or restricted ROM, activity intolerance, impaired balance, impaired physical strength, lacks appropriate home exercise program and pain with function  Understanding of Dx/Px/POC: good   Prognosis: good    Goals  STG  Initiate HEP  Decrease pain by 50% in 3 weeks  Patient performing HEP 50% of time in 3 weeks  LTG  Independent with HEP  Decrease pain by 90% in 6 weeks  Patient performing HEP 90% of time in 6 weeks  FOTO > 55    in 6 weeks        Subjective Evaluation    History of Present Illness  Mechanism of injury: Patient reports right knee pain that started 2 weeks, denies injury, She also reports back pain and radiating symptoms in the right thigh. She tried ibuprofen for 3 days but did not help.             Recurrent probem    Quality of life: good    Patient Goals  Patient goals for therapy: decreased pain, improved balance, increased motion, increased strength, independence with ADLs/IADLs and return to sport/leisure activities    Pain  Quality: radiating, dull ache and knife-like  Relieving factors: change in position  Aggravating factors: stair climbing  Progression: no change    Treatments  Current treatment: physical therapy        Objective     Active Range of Motion     Lumbar   Flexion: 50 degrees   Extension: 30 degrees   Left rotation: 80 degrees   Right rotation: 80 degrees   Left Knee   Flexion: 135 degrees   Extension: 0 degrees     Right Knee   Flexion: 130 degrees with pain  Extension: 0 degrees     Strength/Myotome Testing     Lumbar   Left   Normal strength  Heel walk: normal  Toe walk: normal    Right   Normal strength  Heel walk: normal  Toe walk: normal    Left Knee   Flexion: 5  Extension: 5    Right Knee   Flexion: 4-  Extension: 4-             Precautions: Medical History    Diagnosis Date Comment Source   Cancer [facility])              Manuals                                                                 Neuro Re-Ed                                                                                                        Ther Ex                                                                                                                     Ther Activity                                       Gait Training                                       Modalities

## 2023-07-13 NOTE — LETTER
2023    Santo Chen  [address] [address]    Patient: Bárbara Head   YOB: 1955   Date of Visit: 2023     Encounter Diagnosis     ICD-10-CM    1. Lumbar radiculopathy  M54.16           Dear Dr. Kar Palafox: Thank you for your recent referral of Bárbara Head. Please review the attached evaluation summary from Bárbara's recent visit. Please verify that you agree with the plan of care by signing the attached order. If you have any questions or concerns, please do not hesitate to call. I sincerely appreciate the opportunity to share in the care of one of your patients and hope to have another opportunity to work with you in the near future. Sincerely,    Mark Barber, PT      Referring Provider:      I certify that I have read the below Plan of Care and certify the need for these services furnished under this plan of treatment while under my care. Caprice Jones DO  [address]   [address]  Via Fax: [phone]          PT Evaluation     Today's date: 2023  Patient name: Bárbara Head  : 1955  MRN: [id number]  Referring provider: Caprice Jones DO  Dx:   Encounter Diagnosis     ICD-10-CM    1. Lumbar radiculopathy  M54.16                      Assessment  Assessment details: Bárbara Grewalresents with signs and symptoms consistent with Lumbar radiculopathy  (primary encounter diagnosis), with loss of range of motion, strength and spinal stabilization. Presents with high reactivity. Bárbara Head would benefit with physical therapy to address these impairments to return to prior level of function.   Impairments: abnormal or restricted ROM, activity intolerance, impaired balance, impaired physical strength, lacks appropriate home exercise program and pain with function  Understanding of Dx/Px/POC: good   Prognosis: good    Goals  STG  Initiate HEP  Decrease pain by 50% in 3 weeks  Patient performing HEP 50% of time in 3 weeks  LTG  Independent with HEP  Decrease pain by 90% in 6 weeks  Patient performing HEP 90% of time in 6 weeks  FOTO > 55    in 6 weeks        Subjective Evaluation    History of Present Illness  Mechanism of injury: Patient reports right knee pain that started 2 weeks, denies injury, She also reports back pain and radiating symptoms in the right thigh. She tried ibuprofen for 3 days but did not help.             Recurrent probem    Quality of life: good    Patient Goals  Patient goals for therapy: decreased pain, improved balance, increased motion, increased strength, independence with ADLs/IADLs and return to sport/leisure activities    Pain  Quality: radiating, dull ache and knife-like  Relieving factors: change in position  Aggravating factors: stair climbing  Progression: no change    Treatments  Current treatment: physical therapy        Objective     Active Range of Motion     Lumbar   Flexion: 50 degrees   Extension: 30 degrees   Left rotation: 80 degrees   Right rotation: 80 degrees   Left Knee   Flexion: 135 degrees   Extension: 0 degrees     Right Knee   Flexion: 130 degrees with pain  Extension: 0 degrees     Strength/Myotome Testing     Lumbar   Left   Normal strength  Heel walk: normal  Toe walk: normal    Right   Normal strength  Heel walk: normal  Toe walk: normal    Left Knee   Flexion: 5  Extension: 5    Right Knee   Flexion: 4-  Extension: 4-            Precautions: Medical History    Diagnosis Date Comment Source   Cancer [facility])              Manuals                                                                 Neuro Re-Ed                                                                                                        Ther Ex                                                                                                                     Ther Activity                                       Gait Training Modalities

## 2023-07-18 ENCOUNTER — OFFICE VISIT (OUTPATIENT)
Dept: OBGYN CLINIC | Facility: CLINIC | Age: 68
End: 2023-07-18
Payer: MEDICARE

## 2023-07-18 ENCOUNTER — APPOINTMENT (OUTPATIENT)
Dept: RADIOLOGY | Facility: CLINIC | Age: 68
End: 2023-07-18
Payer: MEDICARE

## 2023-07-18 VITALS
SYSTOLIC BLOOD PRESSURE: 153 MMHG | BODY MASS INDEX: 27.48 KG/M2 | HEIGHT: 60 IN | DIASTOLIC BLOOD PRESSURE: 76 MMHG | HEART RATE: 81 BPM | WEIGHT: 140 LBS

## 2023-07-18 DIAGNOSIS — M25.561 RIGHT KNEE PAIN, UNSPECIFIED CHRONICITY: ICD-10-CM

## 2023-07-18 DIAGNOSIS — M22.2X1 PATELLOFEMORAL PAIN SYNDROME OF RIGHT KNEE: Primary | ICD-10-CM

## 2023-07-18 DIAGNOSIS — Z01.89 ENCOUNTER FOR LOWER EXTREMITY COMPARISON IMAGING STUDY: ICD-10-CM

## 2023-07-18 PROCEDURE — 73564 X-RAY EXAM KNEE 4 OR MORE: CPT

## 2023-07-18 PROCEDURE — 99213 OFFICE O/P EST LOW 20 MIN: CPT | Performed by: ORTHOPAEDIC SURGERY

## 2023-07-18 NOTE — PROGRESS NOTES
Assessment/Plan:  1. Patellofemoral pain syndrome of right knee  XR knee 4+ vw right injury    Ambulatory Referral to Physical Therapy      2. Encounter for lower extremity comparison imaging study  XR knee 4+ vw left injury        Scribe Attestation    I,:  Vladimir Horton Call am acting as a scribe while in the presence of the attending physician.:       I,:  Catherine Frye MD personally performed the services described in this documentation    as scribed in my presence.:             Upon review of the x-rays, a thorough history and my physical examination the patient presented with signs symptoms consistent with patellofemoral syndrome. In my opinion there is a component of IT band syndrome as well. She does have mild degenerative changes on x-ray but I do not feel that is the driving factor in her symptoms today. She will do well with physical therapy. The goal with therapy is to strengthen musculature surrounding the knee as well as increasing core and hip strength. A physical therapy order was placed today. She will attend physical therapy over the next 6 weeks and return to see me for follow-up. Subjective: The patient is a 70 y.o. female who presents to the office today for right knee pain. The patient states that her right knee pain began approximately 3 weeks ago. She describes the pain as intermittent and located about the lateral aspect of the right knee that will radiate anteriorly. The pain will radiate proximally into the lateral thigh to the posterior right hip. Intermittently she will have pain into the right lower leg and calf. Descending stairs will exacerbate her symptoms. The patient admits to walking for nearly an hour and 1/2/day. Though she admits to inconsistency with this of late. She is better with rest.  She denies distal paresthesias. She denies any specific injury to the knee. She denies mechanical symptoms.       Review of Systems   Constitutional: Negative for chills, fever and unexpected weight change. HENT: Negative for hearing loss, nosebleeds and sore throat. Eyes: Negative for pain, redness and visual disturbance. Respiratory: Negative for cough, shortness of breath and wheezing. Cardiovascular: Negative for chest pain, palpitations and leg swelling. Gastrointestinal: Negative for abdominal pain, nausea and vomiting. Endocrine: Negative for polydipsia and polyuria. Genitourinary: Negative for dysuria and hematuria. Musculoskeletal:        See HPI   Skin: Negative for rash and wound. Neurological: Negative for dizziness, numbness and headaches. Psychiatric/Behavioral: Negative for decreased concentration and suicidal ideas. The patient is not nervous/anxious.           Past Medical History:   Diagnosis Date   • Cancer [facility])        Past Surgical History:   Procedure Laterality Date   • HYSTERECTOMY         Family History   Problem Relation Age of Onset   • Heart disease Mother    • Lung disease Father    • Heart disease Brother    • Lung disease Brother        Social History     Occupational History   • Not on file   Tobacco Use   • Smoking status: Never   • Smokeless tobacco: Never   Vaping Use   • Vaping Use: Never used   Substance and Sexual Activity   • Alcohol use: Yes     Comment: occasional   • Drug use: Never   • Sexual activity: Not on file         Current Outpatient Medications:   •  aspirin 81 mg chewable tablet, Chew 81 mg daily, Disp: , Rfl:   •  azelastine (ASTELIN) 0.1 % nasal spray, 1 spray into each nostril (Patient not taking: Reported on 3/10/2023), Disp: , Rfl:   •  [employer] Saline Nasal [place] 0.65 % nasal spray, 2 SPRAYS EACH NARES 3 TIMES A DAY, Disp: , Rfl:   •  fluticasone (FLONASE) 50 mcg/act nasal spray, 1 spray into each nostril, Disp: , Rfl:   •  meloxicam (MOBIC) 7.5 mg tablet, Take 7.5 mg by mouth daily, Disp: , Rfl:   •  metFORMIN (GLUCOPHAGE) 500 mg tablet, Take 1 tablet by mouth 2 (two) times a day, Disp: , Rfl:   • metoprolol succinate (TOPROL-XL) 25 mg 24 hr tablet, Take 1 tablet by mouth daily, Disp: , Rfl:   •  omeprazole (PriLOSEC) 20 mg delayed release capsule, TAKE 1 CAPSULE BY MOUTH EVERY DAY 30 MINUTES BEFORE MORNING MEAL, Disp: , Rfl:   •  OneTouch Verio test strip, TEST BLOOD SUGAR ONCE DAILY, DX  E11.9, Disp: , Rfl:   •  triamcinolone (KENALOG) 0.1 % oral topical paste, Apply 1 application to teeth 2 (two) times a day, Disp: , Rfl:     Allergies   Allergen Reactions   • Sulfa Antibiotics Other (See Comments), Anaphylaxis and Swelling     Other reaction(s): Other (See Comments)     • Shellfish Allergy - Food Allergy Other (See Comments)       Objective:  Vitals:    07/18/23 1356   BP: 153/76   Pulse: 81       Right Knee Exam     Tenderness   The patient is experiencing tenderness in the lateral joint line and medial joint line. Range of Motion   Extension: 0   Flexion: 130     Tests   Bianca:  Medial - negative Lateral - negative  Varus: negative Valgus: negative  Lachman:  Anterior - negative    Posterior - negative  Drawer:  Anterior - negative    Posterior - negative    Other   Erythema: absent  Scars: absent  Sensation: normal  Swelling: mild  Effusion: no effusion present    Comments:  (-) Jeremias's    (-) Patellar grind          Observations     Right Knee   Negative for effusion. Physical Exam  Vitals reviewed. Constitutional:       Appearance: She is well-developed. HENT:      Head: Normocephalic and atraumatic. Eyes:      General:         Right eye: No discharge. Left eye: No discharge. Conjunctiva/sclera: Conjunctivae normal.   Cardiovascular:      Rate and Rhythm: Regular rhythm. Pulmonary:      Effort: Pulmonary effort is normal. No respiratory distress. Breath sounds: No stridor. Musculoskeletal:      Cervical back: Normal range of motion and neck supple. Right knee: No effusion.       Instability Tests: Medial Bianca test negative and lateral Bianca test negative. Skin:     General: Skin is warm and dry. Neurological:      Mental Status: She is alert and oriented to person, place, and time. Psychiatric:         Behavior: Behavior normal.         I have personally reviewed pertinent films in PACS and my interpretation is as follows:  Xray of the right knee taken today demonstrate mild degenerative changes. There is no evidence of acute fracture or other osseous abnormality. This document was created using speech voice recognition software. Grammatical errors, random word insertions, pronoun errors, and incomplete sentences are an occasional consequence of this system due to software limitations, ambient noise, and hardware issues. Any formal questions or concerns about content, text, or information contained within the body of this dictation should be directly addressed to the provider for clarification.

## 2023-07-24 ENCOUNTER — OFFICE VISIT (OUTPATIENT)
Dept: PHYSICAL THERAPY | Facility: CLINIC | Age: 68
End: 2023-07-24
Payer: MEDICARE

## 2023-07-24 DIAGNOSIS — M54.16 LUMBAR RADICULOPATHY: Primary | ICD-10-CM

## 2023-07-24 PROCEDURE — 97110 THERAPEUTIC EXERCISES: CPT | Performed by: PHYSICAL THERAPIST

## 2023-07-24 PROCEDURE — 97112 NEUROMUSCULAR REEDUCATION: CPT | Performed by: PHYSICAL THERAPIST

## 2023-07-24 NOTE — PROGRESS NOTES
Daily Note     Today's date: 2023  Patient name: Bárbara Head  : 1955  MRN: [id number]  Referring provider: Margaux Contreras DO  Dx:   Encounter Diagnosis     ICD-10-CM    1. Lumbar radiculopathy  M54.16                      Subjective: My right lateral knee is painful. To MD: possible ITB syndrome      Objective: See treatment diary below      Assessment: Tolerated treatment well. Patient demonstrated fatigue post treatment and exhibited good technique with therapeutic exercises      Plan: Continue per plan of care.       Precautions: Medical History    Diagnosis Date Comment Source   Cancer [facility])              Manuals                                                                 Neuro Re-Ed             Neurac supine pelvic lift 2x5            Neurac Bridge 2x5            Neurac SDLY hip ADD 2x5            Neurac SDLY Hip ABD 2x5                                                   Ther Ex             PB supine bridge Bi-Unilat 2x20            ITB stretch 5x            Hamstring stretch 5x            STS 20x            SLR ABD 2x10 man resist                                                   Ther Activity                                       Gait Training                                       Modalities

## 2023-07-26 ENCOUNTER — OFFICE VISIT (OUTPATIENT)
Dept: PHYSICAL THERAPY | Facility: CLINIC | Age: 68
End: 2023-07-26
Payer: MEDICARE

## 2023-07-26 DIAGNOSIS — M54.16 LUMBAR RADICULOPATHY: Primary | ICD-10-CM

## 2023-07-26 PROCEDURE — 97110 THERAPEUTIC EXERCISES: CPT | Performed by: PHYSICAL THERAPIST

## 2023-07-26 PROCEDURE — 97112 NEUROMUSCULAR REEDUCATION: CPT | Performed by: PHYSICAL THERAPIST

## 2023-07-26 NOTE — PROGRESS NOTES
Daily Note     Today's date: 2023  Patient name: Bárbara Head  : 1955  MRN: [id number]  Referring provider: Sherice Gleason DO  Dx:   Encounter Diagnosis     ICD-10-CM    1. Lumbar radiculopathy  M54.16                      Subjective: No change yet with knee      Objective: See treatment diary below      Assessment: Tolerated treatment well. Patient demonstrated fatigue post treatment and exhibited good technique with therapeutic exercises      Plan: Continue per plan of care.       Precautions: Medical History    Diagnosis Date Comment Source   Cancer [facility])              Manuals                                                                 Neuro Re-Ed            Neurac supine pelvic lift 2x5 2x5           Neurac Bridge 2x5 2x5           Neurac SDLY hip ADD 2x5 2x5           Neurac SDLY Hip ABD 2x5 2x5           [place] Bal Walkouts  #12 4 x10                                     Ther Ex             PB supine bridge Bi-Unilat 2x20 2x20           ITB stretch 5x 10x           Hamstring stretch 5x 10x           STS 20x 20x           SLR ABD 2x10 man resist            TRX Squats  20x           FSU/LSU  6" 2x20           TM retro  3m           STS  20x                                     Gait Training                                       Modalities

## 2023-07-31 ENCOUNTER — OFFICE VISIT (OUTPATIENT)
Dept: PHYSICAL THERAPY | Facility: CLINIC | Age: 68
End: 2023-07-31
Payer: MEDICARE

## 2023-07-31 DIAGNOSIS — M54.16 LUMBAR RADICULOPATHY: Primary | ICD-10-CM

## 2023-07-31 PROCEDURE — 97110 THERAPEUTIC EXERCISES: CPT | Performed by: PHYSICAL THERAPIST

## 2023-07-31 PROCEDURE — 97112 NEUROMUSCULAR REEDUCATION: CPT | Performed by: PHYSICAL THERAPIST

## 2023-07-31 NOTE — PROGRESS NOTES
Daily Note     Today's date: 2023  Patient name: Bárbara Head  : 1955  MRN: [id number]  Referring provider: Aleisha Shaffer DO  Dx:   Encounter Diagnosis     ICD-10-CM    1. Lumbar radiculopathy  M54.16                      Subjective: reports right knee pain. Objective: See treatment diary below      Assessment: Tolerated treatment well. Patient demonstrated fatigue post treatment and exhibited good technique with therapeutic exercises      Plan: Continue per plan of care.       Precautions: Medical History    Diagnosis Date Comment Source   Cancer [facility])              Manuals                                                                 Neuro Re-Ed           Neurac supine pelvic lift 2x5 2x5 2x5          Neurac Bridge 2x5 2x5 2x5          Neurac SDLY hip ADD 2x5 2x5 2x5          Neurac SDLY Hip ABD 2x5 2x5 2x5          [place] Bal Walkouts  #12 4 x10 #13 4x10                                    Ther Ex             PB supine bridge Bi-Unilat 2x20 2x20           ITB stretch 5x 10x           Hamstring stretch 5x 10x           STS 20x 20x 20x          SLR ABD 2x10 man resist            TRX Squats  20x 20x          FSU/LSU  6" 2x20 6" 2x20          TM retro  3m 4m          STS  20x 20x                                    Gait Training                                       Modalities

## 2023-08-01 ENCOUNTER — OFFICE VISIT (OUTPATIENT)
Dept: OBGYN CLINIC | Facility: CLINIC | Age: 68
End: 2023-08-01
Payer: MEDICARE

## 2023-08-01 VITALS
SYSTOLIC BLOOD PRESSURE: 149 MMHG | HEIGHT: 60 IN | BODY MASS INDEX: 25.91 KG/M2 | WEIGHT: 132 LBS | DIASTOLIC BLOOD PRESSURE: 79 MMHG | HEART RATE: 73 BPM

## 2023-08-01 DIAGNOSIS — G56.03 CARPAL TUNNEL SYNDROME, BILATERAL: Primary | ICD-10-CM

## 2023-08-01 PROCEDURE — 99214 OFFICE O/P EST MOD 30 MIN: CPT | Performed by: PHYSICIAN ASSISTANT

## 2023-08-01 NOTE — PROGRESS NOTES
Assessment/Plan:  1. Carpal tunnel syndrome, bilateral  US MSK limited        Based on her history and examination, I do suspect the patient has bilateral carpal tunnel syndrome. I did provide her with a cock-up wrist braces to wear at night for symptomatic relief. I also ordered bilateral ultrasounds to rule this out. She will follow-up after her ultrasounds with Dr. Shalini Bustamante, our hand surgeon, for further evaluation and treatment. Subjective:   Bárbara Méndez is a 70 y.o. female who presents   Today for evaluation of her bilateral hands. She notes about a year of intermittent paresthesias into the hands, with the right being worse than the left. She feels that all digits seem to be affected at times, however the thumb seems to be the worst.  Again, the right is much worse than the left. This bothers her when she is on her phone and also when sleeping at night. She notes significant pain about the right hand when sleeping at night. She denies any baseline paresthesias in the office today. Review of Systems   Constitutional: Negative. Negative for chills and fever. HENT: Negative. Negative for ear pain and sore throat. Eyes: Negative. Negative for pain and redness. Respiratory: Negative. Negative for shortness of breath and wheezing. Cardiovascular: Negative for chest pain and palpitations. Gastrointestinal: Negative. Negative for abdominal pain and blood in stool. Endocrine: Negative. Negative for polydipsia and polyuria. Genitourinary: Negative. Negative for difficulty urinating and dysuria. Musculoskeletal:        As noted in HPI   Skin: Negative. Negative for pallor and rash. Neurological: Positive for numbness. Negative for dizziness. Hematological: Negative. Negative for adenopathy. Does not bruise/bleed easily. Psychiatric/Behavioral: Negative. Negative for confusion and suicidal ideas.          Past Medical History:   Diagnosis Date   • Cancer [facility])        Past Surgical History:   Procedure Laterality Date   • HYSTERECTOMY         Family History   Problem Relation Age of Onset   • Heart disease Mother    • Lung disease Father    • Heart disease Brother    • Lung disease Brother        Social History     Occupational History   • Not on file   Tobacco Use   • Smoking status: Never   • Smokeless tobacco: Never   Vaping Use   • Vaping Use: Never used   Substance and Sexual Activity   • Alcohol use: Yes     Comment: occasional   • Drug use: Never   • Sexual activity: Not on file         Current Outpatient Medications:   •  [employer] Saline Nasal [place] 0.65 % nasal spray, 2 SPRAYS EACH NARES 3 TIMES A DAY, Disp: , Rfl:   •  fluticasone (FLONASE) 50 mcg/act nasal spray, 1 spray into each nostril, Disp: , Rfl:   •  metFORMIN (GLUCOPHAGE) 500 mg tablet, Take 1 tablet by mouth 2 (two) times a day, Disp: , Rfl:   •  OneTouch Verio test strip, TEST BLOOD SUGAR ONCE DAILY, DX  E11.9, Disp: , Rfl:   •  aspirin 81 mg chewable tablet, Chew 81 mg daily, Disp: , Rfl:   •  azelastine (ASTELIN) 0.1 % nasal spray, 1 spray into each nostril (Patient not taking: Reported on 3/10/2023), Disp: , Rfl:   •  meloxicam (MOBIC) 7.5 mg tablet, Take 7.5 mg by mouth daily, Disp: , Rfl:   •  metoprolol succinate (TOPROL-XL) 25 mg 24 hr tablet, Take 1 tablet by mouth daily, Disp: , Rfl:   •  omeprazole (PriLOSEC) 20 mg delayed release capsule, TAKE 1 CAPSULE BY MOUTH EVERY DAY 30 MINUTES BEFORE MORNING MEAL, Disp: , Rfl:   •  triamcinolone (KENALOG) 0.1 % oral topical paste, Apply 1 application to teeth 2 (two) times a day, Disp: , Rfl:     Allergies   Allergen Reactions   • Sulfa Antibiotics Other (See Comments), Anaphylaxis and Swelling     Other reaction(s):  Other (See Comments)     • Shellfish Allergy - Food Allergy Other (See Comments)       Objective:  Vitals:    08/01/23 1100   BP: 149/79   Pulse: 73     Pain Score: 0-No pain      Ortho Exam    Physical Exam  Constitutional:       General: She is not in acute distress. Appearance: She is well-developed. HENT:      Head: Normocephalic and atraumatic. Eyes:      General: No scleral icterus. Conjunctiva/sclera: Conjunctivae normal.   Neck:      Vascular: No JVD. Cardiovascular:      Rate and Rhythm: Normal rate. Pulmonary:      Effort: Pulmonary effort is normal. No respiratory distress. Skin:     General: Skin is warm. Neurological:      Mental Status: She is alert and oriented to person, place, and time. Coordination: Coordination normal.       Right hand: Significant arthritic changes throughout the hand with Heberden's and Bertha's nodes. Positive Phalen's/Durkan's test.  Negative Tinel's test carpal tunnel. 4+/5 strength APB. Sensation intact median, ulnar, and radial nerve distributions. Left hand:Significant arthritic changes throughout the hand with Heberden's and Bertha's nodes. Negative Phalen's/Durkan's test.  Negative Tinel's test carpal tunnel. 5/5 strength APB. Sensation intact median, ulnar, and radial nerve distributions. This document was created using speech voice recognition software. Grammatical errors, random word insertions, pronoun errors, and incomplete sentences are an occasional consequence of this system due to software limitations, ambient noise, and hardware issues. Any formal questions or concerns about content, text, or information contained within the body of this dictation should be directly addressed to the provider for clarification.

## 2023-08-29 ENCOUNTER — OFFICE VISIT (OUTPATIENT)
Dept: OBGYN CLINIC | Facility: CLINIC | Age: 68
End: 2023-08-29
Payer: MEDICARE

## 2023-08-29 VITALS
DIASTOLIC BLOOD PRESSURE: 77 MMHG | HEIGHT: 60 IN | WEIGHT: 132 LBS | HEART RATE: 80 BPM | BODY MASS INDEX: 25.91 KG/M2 | SYSTOLIC BLOOD PRESSURE: 155 MMHG

## 2023-08-29 DIAGNOSIS — M22.2X1 PATELLOFEMORAL PAIN SYNDROME OF RIGHT KNEE: Primary | ICD-10-CM

## 2023-08-29 PROCEDURE — 99213 OFFICE O/P EST LOW 20 MIN: CPT | Performed by: ORTHOPAEDIC SURGERY

## 2023-08-31 NOTE — PROGRESS NOTES
Assessment/Plan:    53-year-old female with right patellofemoral pain syndrome and patellofemoral arthritis    I had a long discussion with the patient regarding her diagnosis and treatment plan. She continues to have pain mostly localized to the anterior medial aspect of the knee. She did 4 visits with physical therapy and did not feel it was beneficial.  I did discuss that it is possible that she has a meniscus tear in her knee or other intra-articular derangement. However where she localizes her pain is more consistent with patellofemoral pain and early patellofemoral arthritis which is evident on her x-rays. She did request an MRI to evaluate for any additional tearing or causes of pain. I agree and I did order the MRI. I will see her back after MRI is completed if is approved by insurance. I did warn her that it is possible that it may not be approved until she completes 6 weeks of physical therapy. I also explained that this is likely to help her symptoms. I recommend she continue over the counter medications as well as ice and heat as needed for pain. Subjective: The patient is a 70 y.o. female who turns for follow-up of her knee pain. Since last visit she has did 4 visits of physical therapy. She did not feel these were helping and believes she can complete the exercises on her own at the gym so she has not gone back. She continues to have pain most of the anterior and anterior medial aspect of the knee that is worse with descending stairs. She does not have any significant episodes of locking or swelling. She has no episodes of instability. Review of Systems   Constitutional: Negative for chills, fever and unexpected weight change. HENT: Negative for hearing loss, nosebleeds and sore throat. Eyes: Negative for pain, redness and visual disturbance. Respiratory: Negative for cough, shortness of breath and wheezing.     Cardiovascular: Negative for chest pain, palpitations and leg swelling. Gastrointestinal: Negative for abdominal pain, nausea and vomiting. Endocrine: Negative for polydipsia and polyuria. Genitourinary: Negative for dysuria and hematuria. Musculoskeletal:        See HPI   Skin: Negative for rash and wound. Neurological: Negative for dizziness, numbness and headaches. Psychiatric/Behavioral: Negative for decreased concentration and suicidal ideas. The patient is not nervous/anxious.           Past Medical History:   Diagnosis Date   • Cancer [facility])        Past Surgical History:   Procedure Laterality Date   • HYSTERECTOMY         Family History   Problem Relation Age of Onset   • Heart disease Mother    • Lung disease Father    • Heart disease Brother    • Lung disease Brother        Social History     Occupational History   • Not on file   Tobacco Use   • Smoking status: Never   • Smokeless tobacco: Never   Vaping Use   • Vaping Use: Never used   Substance and Sexual Activity   • Alcohol use: Yes     Comment: occasional   • Drug use: Never   • Sexual activity: Not on file         Current Outpatient Medications:   •  metFORMIN (GLUCOPHAGE) 500 mg tablet, Take 1 tablet by mouth 2 (two) times a day, Disp: , Rfl:   •  OneTouch Verio test strip, TEST BLOOD SUGAR ONCE DAILY, DX  E11.9, Disp: , Rfl:   •  aspirin 81 mg chewable tablet, Chew 81 mg daily, Disp: , Rfl:   •  azelastine (ASTELIN) 0.1 % nasal spray, 1 spray into each nostril (Patient not taking: Reported on 3/10/2023), Disp: , Rfl:   •  [employer] Saline Nasal [place] 0.65 % nasal spray, 2 SPRAYS EACH NARES 3 TIMES A DAY (Patient not taking: Reported on 8/29/2023), Disp: , Rfl:   •  fluticasone (FLONASE) 50 mcg/act nasal spray, 1 spray into each nostril (Patient not taking: Reported on 8/29/2023), Disp: , Rfl:   •  meloxicam (MOBIC) 7.5 mg tablet, Take 7.5 mg by mouth daily, Disp: , Rfl:   •  metoprolol succinate (TOPROL-XL) 25 mg 24 hr tablet, Take 1 tablet by mouth daily, Disp: , Rfl:   •  omeprazole (PriLOSEC) 20 mg Psychiatric:         Behavior: Behavior normal.         I have personally reviewed pertinent films in PACS and my interpretation is as follows:  Xray of the right knee taken today demonstrate mild degenerative changes. There is no evidence of acute fracture or other osseous abnormality. This document was created using speech voice recognition software. Grammatical errors, random word insertions, pronoun errors, and incomplete sentences are an occasional consequence of this system due to software limitations, ambient noise, and hardware issues. Any formal questions or concerns about content, text, or information contained within the body of this dictation should be directly addressed to the provider for clarification.

## 2023-09-13 ENCOUNTER — HOSPITAL ENCOUNTER (OUTPATIENT)
Dept: RADIOLOGY | Facility: HOSPITAL | Age: 68
Discharge: HOME | End: 2023-09-13
Attending: ORTHOPAEDIC SURGERY
Payer: MEDICARE

## 2023-09-13 DIAGNOSIS — M22.2X1 PATELLOFEMORAL PAIN SYNDROME OF RIGHT KNEE: ICD-10-CM

## 2023-09-13 PROCEDURE — G1004 CDSM NDSC: HCPCS

## 2023-09-13 PROCEDURE — 73721 MRI JNT OF LWR EXTRE W/O DYE: CPT

## 2023-09-25 ENCOUNTER — OFFICE VISIT (OUTPATIENT)
Dept: OBGYN CLINIC | Facility: CLINIC | Age: 68
End: 2023-09-25
Payer: MEDICARE

## 2023-09-25 VITALS
SYSTOLIC BLOOD PRESSURE: 160 MMHG | BODY MASS INDEX: 24.92 KG/M2 | HEART RATE: 80 BPM | HEIGHT: 61 IN | DIASTOLIC BLOOD PRESSURE: 83 MMHG | WEIGHT: 132 LBS

## 2023-09-25 DIAGNOSIS — M22.2X1 PATELLOFEMORAL PAIN SYNDROME OF RIGHT KNEE: ICD-10-CM

## 2023-09-25 DIAGNOSIS — M17.11 PRIMARY OSTEOARTHRITIS OF RIGHT KNEE: ICD-10-CM

## 2023-09-25 DIAGNOSIS — S83.411D SPRAIN OF MEDIAL COLLATERAL LIGAMENT OF RIGHT KNEE, SUBSEQUENT ENCOUNTER: Primary | ICD-10-CM

## 2023-09-25 PROBLEM — S83.411A SPRAIN OF MEDIAL COLLATERAL LIGAMENT OF RIGHT KNEE: Status: ACTIVE | Noted: 2023-09-25

## 2023-09-25 PROCEDURE — 99213 OFFICE O/P EST LOW 20 MIN: CPT | Performed by: ORTHOPAEDIC SURGERY

## 2023-09-25 NOTE — PROGRESS NOTES
Ortho Sports Medicine New Patient Visit     Assesment:   70 y.o. female with right primary knee osteoarthritis, patellofemoral pain syndrome, Grade 1 MCL sprain    Plan: We had a long discussion regarding right knee degenerative joint disease, including treatment options. Upon review of MRI, we discussed that the medial/lateral menisci tears are common in the setting of degenerative joint disease. We discussed surgical options, but the patient is not interested in surgery at this time. I am agreeable to this, especially given the lack of mechanical symptoms in the setting of degenerative changes present. As such, we discussed non-operative treatment options, such as continued physical therapy, bracing options, voltaren gel, ibuprofen, steroid injections, and viscosupplementation injections. The patient elected to proceed with continued PT, voltaren gel, and OTC medications as needed at this time. Because the patient has tenderness along the course of the MCL with some laxity on exam today and Grade 1 sprain noted on MRI, we recommended a hinged knee brace for her to wear during activities and as needed for support and stability. Chief Complaint   Patient presents with   • Right Knee - Follow-up       History of Present Illness: The patient is a 70 y.o. female presenting for follow up of right knee pain and MRI review. The patient reports persistent anterior and medial knee pain that has slightly improved since last visit. Pain occurs after extended periods of activity and with descending stairs. She denies swelling, locking episodes, instability, and numbness/tingling. The patient has attended 3-4 sessions of PT so far as the location and timing of sessions are inconvenient. The patient is quite active at baseline, going to the gym frequently. She has not tried voltaren gel, bracing options, or steroid injections so far. The patient is not interested in steroid injections into the knee.       Knee Surgical History:  None    Past Medical, Social and Family History:  Past Medical History:   Diagnosis Date   • Cancer [facility])      Past Surgical History:   Procedure Laterality Date   • HYSTERECTOMY       Allergies   Allergen Reactions   • Sulfa Antibiotics Other (See Comments), Anaphylaxis and Swelling     Other reaction(s): Other (See Comments)     • Wheat Bran - Food Allergy Other (See Comments) and Swelling   • Shellfish Allergy - Food Allergy Other (See Comments)     Current Outpatient Medications on File Prior to Visit   Medication Sig Dispense Refill   • azelastine (ASTELIN) 0.1 % nasal spray 1 spray into each nostril     • [employer]S Saline Nasal [place] 0.65 % nasal spray      • fluticasone (FLONASE) 50 mcg/act nasal spray 1 spray into each nostril     • metFORMIN (GLUCOPHAGE) 500 mg tablet Take 1 tablet by mouth 2 (two) times a day     • OneTouch Verio test strip TEST BLOOD SUGAR ONCE DAILY, DX  E11.9     • aspirin 81 mg chewable tablet Chew 81 mg daily     • meloxicam (MOBIC) 7.5 mg tablet Take 7.5 mg by mouth daily     • metoprolol succinate (TOPROL-XL) 25 mg 24 hr tablet Take 1 tablet by mouth daily     • omeprazole (PriLOSEC) 20 mg delayed release capsule TAKE 1 CAPSULE BY MOUTH EVERY DAY 30 MINUTES BEFORE MORNING MEAL     • triamcinolone (KENALOG) 0.1 % oral topical paste Apply 1 application to teeth 2 (two) times a day       No current facility-administered medications on file prior to visit.      Social History     Socioeconomic History   • Marital status: Single     Spouse name: Not on file   • Number of children: Not on file   • Years of education: Not on file   • Highest education level: Not on file   Occupational History   • Not on file   Tobacco Use   • Smoking status: Never   • Smokeless tobacco: Never   Vaping Use   • Vaping Use: Never used   Substance and Sexual Activity   • Alcohol use: Yes     Comment: occasional   • Drug use: Never   • Sexual activity: Not on file   Other Topics Concern   • Not on file   Social History Narrative   • Not on file     Social Determinants of Health     Financial Resource Strain: Not on file   Food Insecurity: Not on file   Transportation Needs: Not on file   Physical Activity: Not on file   Stress: Not on file   Social Connections: Not on file   Intimate Partner Violence: Not on file   Housing Stability: Not on file         I have reviewed the past medical, surgical, social and family history, medications and allergies as documented in the EMR. Review of systems: ROS is negative other than that noted in the HPI. Constitutional: Negative for fatigue and fever. HENT: Negative for sore throat. Respiratory: Negative for shortness of breath. Cardiovascular: Negative for chest pain. Gastrointestinal: Negative for abdominal pain. Endocrine: Negative for cold intolerance and heat intolerance. Genitourinary: Negative for flank pain. Musculoskeletal: Negative for back pain. Skin: Negative for rash. Allergic/Immunologic: Negative for immunocompromised state. Neurological: Negative for dizziness. Psychiatric/Behavioral: Negative for agitation. Physical Exam:    Blood pressure 160/83, pulse 80, height 5' 1" (1.549 m), weight 59.9 kg (132 lb). General/Constitutional: NAD, well developed, well nourished  HENT: Normocephalic, atraumatic  CV: Intact distal pulses, regular rate  Resp: No respiratory distress or labored breathing  Lymphatic: No lymphadenopathy palpated  Neuro: Alert and Oriented x 3, no focal deficits  Psych: Normal mood, normal affect  Skin: Warm, dry, no rashes, no erythema      Knee Exam:   No significant skin lesions or deformity. No joint effusion. Range of motion from 0 to 130 without pain. Mild distal quad tenderness. Mild lateral and medial joint line tenderness. Moderate tenderness along the course of the MCL. Slight laxity to valgus stress. Knee is stable to varus stress, Lachman, and anterior/posterior drawer.  Neurovascularly intact distally      Knee Imaging    MRI of the right knee reviewed and interpreted today, which demonstrates moderate cartilage thinning of the medial compartment and mild cartilage thinning of the patellofemoral compartment. Horizontal tears of the medial and lateral menisci. Grade 1 MCL sprain.

## 2023-10-31 ENCOUNTER — HOSPITAL ENCOUNTER (OUTPATIENT)
Dept: RADIOLOGY | Facility: HOSPITAL | Age: 68
Discharge: HOME | End: 2023-10-31
Payer: MEDICARE

## 2023-10-31 DIAGNOSIS — G56.03 CARPAL TUNNEL SYNDROME, BILATERAL: ICD-10-CM

## 2023-10-31 PROCEDURE — 76882 US LMTD JT/FCL EVL NVASC XTR: CPT

## 2023-11-02 ENCOUNTER — TELEPHONE (OUTPATIENT)
Dept: OBGYN CLINIC | Facility: CLINIC | Age: 68
End: 2023-11-02

## 2023-11-02 NOTE — TELEPHONE ENCOUNTER
Called PT about rescheduling her appointment on 11/24 and patient stated that she is unsure of her schedule around that time so she will call back when she has a better idea.

## 2024-03-04 ENCOUNTER — EVALUATION (OUTPATIENT)
Dept: PHYSICAL THERAPY | Facility: CLINIC | Age: 69
End: 2024-03-04
Payer: MEDICARE

## 2024-03-04 DIAGNOSIS — G51.0 BELL'S PALSY: Primary | ICD-10-CM

## 2024-03-04 PROCEDURE — 97161 PT EVAL LOW COMPLEX 20 MIN: CPT | Performed by: PHYSICAL THERAPIST

## 2024-03-04 NOTE — LETTER
2024    [facility]  [address] [place] [address]    Patient: Bárbara Head   YOB: 1955   Date of Visit: 3/4/2024     Encounter Diagnosis     ICD-10-CM    1. Bell's palsy  G51.0           Dear Dr. Dunham:    Thank you for your recent referral of Bárbara Head. Please review the attached evaluation summary from Bárbara's recent visit.     Please verify that you agree with the plan of care by signing the attached order.     If you have any questions or concerns, please do not hesitate to call.     I sincerely appreciate the opportunity to share in the care of one of your patients and hope to have another opportunity to work with you in the near future.       Sincerely,    Angel Neely, PT      Referring Provider:      I certify that I have read the below Plan of Care and certify the need for these services furnished under this plan of treatment while under my care.                    [facility]  [address] [place] [address]  Via Fax: [phone]          PT Evaluation     Today's date: 3/4/2024  Patient name: Bárbara Head  : 1955  MRN: [id number]  Referring provider: Estefania Dunham, *  Dx:   Encounter Diagnosis     ICD-10-CM    1. Bell's palsy  G51.0                      Assessment  Assessment details: Patient presents with right sided facial weakness:  Left 5/5, Right 4/5.   She was instructed in a HEP of facial expressions in mirror with proper emotional response, as biofeedback to enhance return of facial function.  Impairments: impaired physical strength    Subjective Evaluation    History of Present Illness  Mechanism of injury: Patient reports developing right sided facial weakness/pain on , also associated with a right sided facial tremor.  She is taking steroids and is gradually improving.      Objective           Precautions: Medical History    Diagnosis Date Comment Source   Cancer (HCC)            Manuals                                                                  Neuro Re-Ed 3/4            Biofeedback facial expressions w emotional response MV                                                                                          Ther Ex                                                                                                                     Ther Activity                                       Gait Training                                       Modalities

## 2024-03-04 NOTE — PROGRESS NOTES
PT Evaluation     Today's date: 3/4/2024  Patient name: Bárbara Haed  : 1955  MRN: [id number]  Referring provider: Estefania Dunham, *  Dx:   Encounter Diagnosis     ICD-10-CM    1. Bell's palsy  G51.0                      Assessment  Assessment details: Patient presents with right sided facial weakness:  Left 5/5, Right 4/5.   She was instructed in a HEP of facial expressions in mirror with proper emotional response, as biofeedback to enhance return of facial function.  Impairments: impaired physical strength    Subjective Evaluation    History of Present Illness  Mechanism of injury: Patient reports developing right sided facial weakness/pain on , also associated with a right sided facial tremor.  She is taking steroids and is gradually improving.      Objective           Precautions: Medical History    Diagnosis Date Comment Source   Cancer (HCC)            Manuals                                                                 Neuro Re-Ed 3/4            Biofeedback facial expressions w emotional response MV                                                                                          Ther Ex                                                                                                                     Ther Activity                                       Gait Training                                       Modalities

## 2025-05-05 ENCOUNTER — OFFICE VISIT (OUTPATIENT)
Dept: OBGYN CLINIC | Facility: CLINIC | Age: 70
End: 2025-05-05
Payer: MEDICARE

## 2025-05-05 VITALS — BODY MASS INDEX: 26.9 KG/M2 | HEIGHT: 60 IN | WEIGHT: 137 LBS

## 2025-05-05 DIAGNOSIS — M23.92 INTERNAL DERANGEMENT OF LEFT KNEE: Primary | ICD-10-CM

## 2025-05-05 PROCEDURE — 99214 OFFICE O/P EST MOD 30 MIN: CPT | Performed by: ORTHOPAEDIC SURGERY

## 2025-05-05 RX ORDER — MOMETASONE FUROATE 1 MG/G
CREAM TOPICAL
COMMUNITY
Start: 2025-04-30

## 2025-05-05 NOTE — PROGRESS NOTES
Patient Name: Bárbara Head      : 1955       MRN: [id number]   Encounter Provider: Ander Esposito MD   Encounter Date: 25  Encounter department: [facility]         Assessment & Plan  Internal derangement of left knee  Given her pain began after physical activity and her positive Bianca testing, I have ordered an MRI of the left knee to further evaluate the soft tissue structures. It is possible her symptoms are arthritic in nature;however, they could also be indicative of a meniscal injury. She should continue with her physical therapy as scheduled for 2025. She will follow-up in the office after obtaining the MRI of the left knee to review the findings.  If the meniscus tear is encountered we will discuss postoperative nonoperative options.  Orders:    MRI knee left  wo contrast; Future       _____________________________________________________  CHIEF COMPLAINT:  Chief Complaint   Patient presents with    Left Knee - Pain         SUBJECTIVE:  Bárbara Head is a 70 y.o. female who presents for initial evaluation of the left knee.  The left knee began bothering her one month ago after hiking [place]. She recalls the ground being very uneven on the way down. She does not recall experiencing a pop or crack within the left knee. She reports swelling of the left knee following the hike. She saw her PCP on 2025 for the left knee, where X-rays were obtained. She reports a pinching sensation of the knee. She indicates her pain is located anteromedially. She also reports lateral pain that begins at the knee and goes up to the lateral left hip. She applied an anti-inflammatory cream which helped the swelling. She had also been using a compressive wrap. She has attended physical therapy in the past for her right knee, which seemed to help. She has a history of meniscal tearing of the right knee that was treated non operatively.    Knee  Surgical History:  None    PAST MEDICAL HISTORY:  Past Medical History:   Diagnosis Date    Cancer (HCC)        PAST SURGICAL HISTORY:  Past Surgical History:   Procedure Laterality Date    HYSTERECTOMY         FAMILY HISTORY:  Family History   Problem Relation Age of Onset    Heart disease Mother     Lung disease Father     Heart disease Brother     Lung disease Brother        SOCIAL HISTORY:  Social History     Tobacco Use    Smoking status: Never    Smokeless tobacco: Never   Vaping Use    Vaping status: Never Used   Substance Use Topics    Alcohol use: Yes     Comment: occasional    Drug use: Never       MEDICATIONS:    Current Outpatient Medications:     azelastine (ASTELIN) 0.1 % nasal spray, 1 spray into each nostril, Disp: , Rfl:     [facility] Saline Nasal [place] 0.65 % nasal spray, , Disp: , Rfl:     Diclofenac Sodium (VOLTAREN) 1 %, Apply 2 g topically 4 (four) times a day, Disp: 100 g, Rfl: 0    fluticasone (FLONASE) 50 mcg/act nasal spray, 1 spray into each nostril, Disp: , Rfl:     metFORMIN (GLUCOPHAGE) 500 mg tablet, Take 1 tablet by mouth 2 (two) times a day (Patient taking differently: Take 1 tablet by mouth 2 (two) times a day PRN. AS NEEDED), Disp: , Rfl:     mometasone (ELOCON) 0.1 % cream, APPLY THIN COAT TO AFFECTED AREA EVERY DAY, Disp: , Rfl:     OneTouch Verio test strip, TEST BLOOD SUGAR ONCE DAILY, DX  E11.9, Disp: , Rfl:     aspirin 81 mg chewable tablet, Chew 81 mg daily, Disp: , Rfl:     meloxicam (MOBIC) 7.5 mg tablet, Take 7.5 mg by mouth daily, Disp: , Rfl:     metoprolol succinate (TOPROL-XL) 25 mg 24 hr tablet, Take 1 tablet by mouth daily, Disp: , Rfl:     omeprazole (PriLOSEC) 20 mg delayed release capsule, TAKE 1 CAPSULE BY MOUTH EVERY DAY 30 MINUTES BEFORE MORNING MEAL, Disp: , Rfl:     triamcinolone (KENALOG) 0.1 % oral topical paste, Apply 1 application to teeth 2 (two) times a day, Disp: , Rfl:     ALLERGIES:  Allergies   Allergen Reactions    Sulfa Antibiotics Other (See  "Comments), Anaphylaxis and Swelling     Other reaction(s): Other (See Comments)      Wheat Bran - Food Allergy Other (See Comments) and Swelling    Shellfish Allergy - Food Allergy Other (See Comments)       LABS:  HgA1c: No results found for: \"HGBA1C\"  BMP:   Lab Results   Component Value Date    CALCIUM 8.4 04/29/2021    K 3.9 04/29/2021    CO2 29 04/29/2021     04/29/2021    BUN 19 04/29/2021    CREATININE 0.68 04/29/2021     CBC: No components found for: \"CBC\"    _____________________________________________________  Review of systems: ROS is negative other than that noted in the HPI.  Constitutional: Negative for fatigue and fever.   HENT: Negative for sore throat.    Respiratory: Negative for shortness of breath.    Cardiovascular: Negative for chest pain.   Gastrointestinal: Negative for abdominal pain.   Endocrine: Negative for cold intolerance and heat intolerance.   Genitourinary: Negative for flank pain.   Musculoskeletal: Negative for back pain.   Skin: Negative for rash.   Allergic/Immunologic: Negative for immunocompromised state.   Neurological: Negative for dizziness.   Psychiatric/Behavioral: Negative for agitation.     Knee Exam:   No significant skin lesions or deformity  Minimal effusion  Range of motion from 0° to 120°  Medial joint line tenderness   Knee is stable to varus stress, valgus stress, Lachman, and posterior drawer.    Patella tracks centrally with mild palpable crepitus  Calf compartments are soft and supple  2+ DP and PT pulses with brisk capillary refill to the toes  Sural, saphenous, tibial, superficial, and deep peroneal motor and sensory distributions intact  Sensation light touch intact distally      Positive Bianca    Physical exam:  General/Constitutional: NAD, well developed, well nourished  HENT: Normocephalic, atraumatic  CV: Intact distal pulses, regular rate  Resp: No respiratory distress or labored breathing  Abdomen: soft, nondistended   Lymphatic: No " lymphadenopathy palpated  Neuro: Alert and Oriented x 3, no focal deficits  Psych: Normal mood, normal affect  Skin: Warm, dry, no rashes, no erythema  _____________________________________________________  STUDIES REVIEWED:  X-ray report of the left knee available for review. Imaging was not available to review. Report states no abnormal findings of the left knee.      PROCEDURES PERFORMED:  No Procedures performed today    Scribe Attestation      I,:  Radha Gan am acting as a scribe while in the presence of the attending physician.:       I,:  Ander Esposito MD personally performed the services described in this documentation    as scribed in my presence.:

## 2025-05-09 ENCOUNTER — HOSPITAL ENCOUNTER (OUTPATIENT)
Dept: RADIOLOGY | Facility: HOSPITAL | Age: 70
Discharge: HOME | End: 2025-05-09
Attending: ORTHOPAEDIC SURGERY
Payer: MEDICARE

## 2025-05-09 DIAGNOSIS — M23.92 INTERNAL DERANGEMENT OF LEFT KNEE: ICD-10-CM

## 2025-05-09 PROCEDURE — 73721 MRI JNT OF LWR EXTRE W/O DYE: CPT

## 2025-05-13 ENCOUNTER — APPOINTMENT (OUTPATIENT)
Dept: RADIOLOGY | Facility: CLINIC | Age: 70
End: 2025-05-13
Attending: STUDENT IN AN ORGANIZED HEALTH CARE EDUCATION/TRAINING PROGRAM
Payer: MEDICARE

## 2025-05-13 ENCOUNTER — OFFICE VISIT (OUTPATIENT)
Dept: OCCUPATIONAL THERAPY | Facility: CLINIC | Age: 70
End: 2025-05-13
Payer: MEDICARE

## 2025-05-13 ENCOUNTER — OFFICE VISIT (OUTPATIENT)
Dept: OBGYN CLINIC | Facility: CLINIC | Age: 70
End: 2025-05-13
Payer: MEDICARE

## 2025-05-13 DIAGNOSIS — M79.641 RIGHT HAND PAIN: Primary | ICD-10-CM

## 2025-05-13 DIAGNOSIS — M79.641 RIGHT HAND PAIN: ICD-10-CM

## 2025-05-13 DIAGNOSIS — M18.11 PRIMARY OSTEOARTHRITIS OF FIRST CARPOMETACARPAL JOINT OF RIGHT HAND: Primary | ICD-10-CM

## 2025-05-13 DIAGNOSIS — M18.11 PRIMARY OSTEOARTHRITIS OF FIRST CARPOMETACARPAL JOINT OF RIGHT HAND: ICD-10-CM

## 2025-05-13 PROCEDURE — L3808 WHFO, RIGID W/O JOINTS: HCPCS

## 2025-05-13 PROCEDURE — 73130 X-RAY EXAM OF HAND: CPT

## 2025-05-13 PROCEDURE — 99213 OFFICE O/P EST LOW 20 MIN: CPT | Performed by: STUDENT IN AN ORGANIZED HEALTH CARE EDUCATION/TRAINING PROGRAM

## 2025-05-13 RX ORDER — IBUPROFEN AND FAMOTIDINE 26.6; 8 MG/1; MG/1
1 TABLET ORAL 3 TIMES DAILY
Qty: 30 TABLET | Refills: 0 | Status: SHIPPED | OUTPATIENT
Start: 2025-05-13 | End: 2025-05-27

## 2025-05-13 RX ORDER — IBUPROFEN 800 MG/1
800 TABLET, FILM COATED ORAL 3 TIMES DAILY
Qty: 30 TABLET | Refills: 0 | Status: SHIPPED | OUTPATIENT
Start: 2025-05-13 | End: 2025-05-20

## 2025-05-13 RX ORDER — FAMOTIDINE 20 MG/1
20 TABLET, FILM COATED ORAL 2 TIMES DAILY
Qty: 30 TABLET | Refills: 0 | Status: SHIPPED | OUTPATIENT
Start: 2025-05-13 | End: 2025-05-23

## 2025-05-13 NOTE — PROGRESS NOTES
ORTHOPAEDIC HAND, WRIST, AND ELBOW OFFICE  VISIT     Name: Bárbara Head      : 1955      MRN: [id number]  Encounter Provider: Jared Jacob MD  Encounter Date: 2025   Encounter department: [facility]  :  Assessment & Plan  Right hand pain    Orders:    XR hand 3+ vw right; Future    Primary osteoarthritis of first carpometacarpal joint of right hand    Orders:    Ibuprofen-Famotidine (Duexis) 800-26.6 MG TABS; Take 1 tablet by mouth 3 (three) times a day for 14 days    Ambulatory Referral to PT/OT Hand Therapy; Future    Diclofenac Sodium (VOLTAREN) 1 %; Apply 2 g topically 3 (three) times a day for 14 days    ASSESSMENT/PLAN:    Bárbara Head is a 70 y.o. RHD female who presents with right thumb CMC arthritis.   Xrays reveal advanced osteoarthritis within several of her joints, especially her right thumb CMC joint.   We discussed the natural history and etiology of this condition detail. We discussed the utility of therapy vs. steroid injection vs. surgery.    We will initiate a course of conservative treatment to include custom splinting and physical/occupational therapy.  Prescription was provided as well as a list of therapists.  She will be going over right now to [facility].   Duexis and Voltaren gel were prescribed and sent to her pharmacy.   She is not interested in steroid injection at this time.    She will return to the office in 3 months for repeat clinical evaluation     ____________________________________________________________________________________________________________________________________________      CHIEF COMPLAINT:  Right thumb pain     SUBJECTIVE:  Bárbara Head is a 70 y.o. RHD female with a h/o ovarian cancer in  s/p surgery and adjuvant chemotherapy. She now presents with right thumb pain for the past week, since she was on the computer all night one night. 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 She has a history of arthritis and hand pain for at least 10 years, but the pain was not this severe. The thumb pain has been waking her up at night, but she denies any numbness/ tingling in her fingers. She ordered a thumb brace, but she couldn't figure out how to fit it so she is returning [facility] to [facility]. She has a history of b/l carpal tunnel, as demonstrated on US from 10/31/25. However, she did not follow up to discuss those results and no longer has any hand numbness/ finger numbness. The only place she has occasional numbness is within her thumb, only when she is engaging in excessive keyboard work.    She is being seen by Dr. Esposito for left knee pain/ meniscus tear and is currently in therapy for that. She is a very active person.     I have personally reviewed all the relevant PMH, PSH, SH, FH, Medications and allergies      PAST MEDICAL HISTORY:  Past Medical History:   Diagnosis Date    Cancer (HCC)        PAST SURGICAL HISTORY:  Past Surgical History:   Procedure Laterality Date    HYSTERECTOMY         FAMILY HISTORY:  Family History   Problem Relation Age of Onset    Heart disease Mother     Lung disease Father     Heart disease Brother     Lung disease Brother        SOCIAL HISTORY:  Social History     Tobacco Use    Smoking status: Never    Smokeless tobacco: Never   Vaping Use    Vaping status: Never Used   Substance Use Topics    Alcohol use: Yes     Comment: occasional    Drug use: Never       MEDICATIONS:    Current Outpatient Medications:     azelastine (ASTELIN) 0.1 % nasal spray, 1 spray into each nostril, Disp: , Rfl:     [employer]S Saline Nasal [place] 0.65 % nasal spray, , Disp: , Rfl:     Diclofenac Sodium (VOLTAREN) 1 %, Apply 2 g topically 4 (four) times a day, Disp: 100 g, Rfl: 0    Diclofenac Sodium (VOLTAREN) 1 %, Apply 2 g topically 3 (three) times a day for 14 days, Disp: 100 g, Rfl: 0    fluticasone (FLONASE) 50 mcg/act nasal spray, 1 spray into each  "nostril, Disp: , Rfl:     Ibuprofen-Famotidine (Duexis) 800-26.6 MG TABS, Take 1 tablet by mouth 3 (three) times a day for 14 days, Disp: 30 tablet, Rfl: 0    metFORMIN (GLUCOPHAGE) 500 mg tablet, Take 1 tablet by mouth 2 (two) times a day (Patient taking differently: Take 1 tablet by mouth 2 (two) times a day PRN. AS NEEDED), Disp: , Rfl:     mometasone (ELOCON) 0.1 % cream, APPLY THIN COAT TO AFFECTED AREA EVERY DAY, Disp: , Rfl:     OneTouch Verio test strip, TEST BLOOD SUGAR ONCE DAILY, DX  E11.9, Disp: , Rfl:     aspirin 81 mg chewable tablet, Chew 81 mg daily, Disp: , Rfl:     meloxicam (MOBIC) 7.5 mg tablet, Take 7.5 mg by mouth daily, Disp: , Rfl:     metoprolol succinate (TOPROL-XL) 25 mg 24 hr tablet, Take 1 tablet by mouth daily, Disp: , Rfl:     omeprazole (PriLOSEC) 20 mg delayed release capsule, TAKE 1 CAPSULE BY MOUTH EVERY DAY 30 MINUTES BEFORE MORNING MEAL, Disp: , Rfl:     triamcinolone (KENALOG) 0.1 % oral topical paste, Apply 1 application to teeth 2 (two) times a day, Disp: , Rfl:     ALLERGIES:  Allergies   Allergen Reactions    Sulfa Antibiotics Other (See Comments), Anaphylaxis and Swelling     Other reaction(s): Other (See Comments)      Wheat Bran - Food Allergy Other (See Comments) and Swelling    Shellfish Allergy - Food Allergy Other (See Comments)         REVIEW OF SYSTEMS:  Pertinent items are noted in HPI.  A comprehensive review of systems was negative.    VITALS:  There were no vitals filed for this visit.    LABS:  HgA1c: No results found for: \"HGBA1C\"  BMP:   Lab Results   Component Value Date    CALCIUM 8.4 04/29/2021    K 3.9 04/29/2021    CO2 29 04/29/2021     04/29/2021    BUN 19 04/29/2021    CREATININE 0.68 04/29/2021       _____________________________________________________  PHYSICAL EXAMINATION:  General: well developed and well nourished, alert, oriented times 3, and appears comfortable  Psychiatric: Normal  HEENT: Normocephalic, Atraumatic Trachea Midline, No " "torticollis  Pulmonary: No audible wheezing or respiratory distress   Abdomen/GI: Non tender, non distended   Cardiovascular: Regular Rate and Rhythm. No pitting edema, 2+ radial pulse   Skin: No masses, erythema, lacerations, fluctation, ulcerations  Neurovascular: Sensation Intact to the Median, Ulnar, Radial Nerve, Motor Intact to the Median, Ulnar, Radial Nerve, and Pulses Intact  Musculoskeletal: Normal, except as noted in detailed exam and in HPI.        FOCUSED MUSCULOSKELETAL EXAMINATION:  Right Upper Extremity  Inspection: edema seen in entire right hand with + deformity noted most prominent in long finger. No ecchymoses, erythema.   Palpation: tenderness of palpation within left thumb: CMC joint, radiating up radial wrist  Neurologic: 5/5 elbow flexion, 5/5 elbow extension, 5/5 wrist extension, 5/5 wrist flexion, 5/5 finger flexion, 5/5 finger extension, 5/5 FPL, 5/5 EPL, 5/5 APB, 5/5 intrinsics, sensation intact to median, radial, and ulnar nerve distributions  Vascular: Palpable radial pulse, brisk cap refill <2sec, hand warm and well perfused  MSK: Unable to make a full composite fist. Positive CMC grind test. Negative carpal compression test. Negative cubital compression test.     ___________________________________________________  STUDIES REVIEWED:  Xrays of the right hand obtained on 5/13/25 were independently reviewed which demonstrates significant arthritis within multiple joints, especially within 1st CMC joint.     LABS REVIEWED:    HgA1c: No results found for: \"HGBA1C\"  BMP:   Lab Results   Component Value Date    CALCIUM 8.4 04/29/2021    K 3.9 04/29/2021    CO2 29 04/29/2021     04/29/2021    BUN 19 04/29/2021    CREATININE 0.68 04/29/2021       PROCEDURES PERFORMED:  Procedures    _____________________________________________________      Scribe Attestation      I,:  Renu Alfonso PA-C am acting as a scribe while in the presence of the attending physician.:       I,:  Jared" Anthony Jacob MD personally performed the services described in this documentation    as scribed in my presence.:               I agree with the history, physical examination, assessment and plan of care as documented above.    Jared Jacob M.D.  Attending, Orthopaedic Surgery  Hand, Wrist, and Elbow Surgery  [facility]

## 2025-05-13 NOTE — PROGRESS NOTES
Orthosis    Diagnosis:   1. Primary osteoarthritis of first carpometacarpal joint of right hand          Indication: Arthritic Condition    Location: Right  thumb  Supplies: Custom Fit Orthotic  Orthosis type: Hand-Based SPICA  Wearing Schedule: Day Time as Tolerated and With Activity Only  Describe Position: Slight opposition, with thumb IP free    Precautions: Universal (skin contact/breakdown)    Patient or Caregiver expresses understanding of wearing Schedule and Precautions? Yes  Patient or Caregiver able to don/doff orthotic independently?Yes    Written orders provided to patient? Yes  Orders Obtained: Written  Orders Obtained from: Dr. Jared Jacob    Return for evaluation and treatment Yes

## 2025-05-13 NOTE — ASSESSMENT & PLAN NOTE
Orders:    Ibuprofen-Famotidine (Duexis) 800-26.6 MG TABS; Take 1 tablet by mouth 3 (three) times a day for 14 days    Ambulatory Referral to PT/OT Hand Therapy; Future    Diclofenac Sodium (VOLTAREN) 1 %; Apply 2 g topically 3 (three) times a day for 14 days

## 2025-05-14 RX ORDER — FAMOTIDINE 20 MG/1
TABLET, FILM COATED ORAL
Refills: 0 | OUTPATIENT
Start: 2025-05-14

## 2025-05-15 ENCOUNTER — OFFICE VISIT (OUTPATIENT)
Dept: OBGYN CLINIC | Facility: CLINIC | Age: 70
End: 2025-05-15
Payer: MEDICARE

## 2025-05-15 VITALS — WEIGHT: 137 LBS | HEIGHT: 60 IN | BODY MASS INDEX: 26.9 KG/M2

## 2025-05-15 DIAGNOSIS — M17.12 PRIMARY OSTEOARTHRITIS OF LEFT KNEE: Primary | ICD-10-CM

## 2025-05-15 DIAGNOSIS — S83.279A: ICD-10-CM

## 2025-05-15 PROCEDURE — 99213 OFFICE O/P EST LOW 20 MIN: CPT | Performed by: ORTHOPAEDIC SURGERY

## 2025-05-15 RX ORDER — TRIAMCINOLONE ACETONIDE 1 MG/G
CREAM TOPICAL
COMMUNITY
Start: 2025-04-17

## 2025-05-15 NOTE — PROGRESS NOTES
Patient Name: Bárbara Head      : 1955       MRN: [id number]   Encounter Provider: Ander Esposito MD   Encounter Date: 05/15/25  Encounter department: [facility]         Assessment & Plan  Primary osteoarthritis of left knee  She is symptomatic of degenerative changes of the left knee. Her recent MRI did show degeneration of the lateral meniscus. This can be managed non operatively via over the counter NSAIDs, physical therapy, and injection therapies. She elected to proceed with physical therapy for the left knee. A referral was provided today for this. She will follow-up on an as needed basis in regards to the left knee.  Orders:    Ambulatory Referral to Physical Therapy; Future    Complex tear of lateral meniscus of knee, initial encounter  Tear is likely resultant of degenerative changes  Orders:    Ambulatory Referral to Physical Therapy; Future       _____________________________________________________  CHIEF COMPLAINT:  Chief Complaint   Patient presents with    Left Knee - Follow-up         SUBJECTIVE:  Bárbara Head is a 70 y.o. female who presents for MRI review of the left knee. Her recent MRI demonstrates a complex lateral meniscus tear and mild cartilage loss of the weightbearing portion of the lateral femoral condyle. She states her pain has improved, but is still present. She indicates her pain is located anteriorly and laterally on the left knee. She finds going down stairs and bending the knee to stretch her quadriceps is most painful. She feels the knee is still heavy and swollen. She has started to go to the gym, which feels is going well. She has been taking Ibuprofen with some relief.     PAST MEDICAL HISTORY:  Past Medical History:   Diagnosis Date    Cancer (HCC)        PAST SURGICAL HISTORY:  Past Surgical History:   Procedure Laterality Date    HYSTERECTOMY         FAMILY HISTORY:  Family History   Problem Relation Age of Onset     "Heart disease Mother     Lung disease Father     Heart disease Brother     Lung disease Brother        SOCIAL HISTORY:  Social History[1]    MEDICATIONS:  Current Medications[2]    ALLERGIES:  Allergies[3]    LABS:  HgA1c: No results found for: \"HGBA1C\"  BMP:   Lab Results   Component Value Date    CALCIUM 8.4 04/29/2021    K 3.9 04/29/2021    CO2 29 04/29/2021     04/29/2021    BUN 19 04/29/2021    CREATININE 0.68 04/29/2021     CBC: No components found for: \"CBC\"    _____________________________________________________  Review of systems: ROS is negative other than that noted in the HPI.  Constitutional: Negative for fatigue and fever.   HENT: Negative for sore throat.    Respiratory: Negative for shortness of breath.    Cardiovascular: Negative for chest pain.   Gastrointestinal: Negative for abdominal pain.   Endocrine: Negative for cold intolerance and heat intolerance.   Genitourinary: Negative for flank pain.   Musculoskeletal: Negative for back pain.   Skin: Negative for rash.   Allergic/Immunologic: Negative for immunocompromised state.   Neurological: Negative for dizziness.   Psychiatric/Behavioral: Negative for agitation.     Knee Exam:   No significant skin lesions or deformity  Range of motion from 0° to 115°  Lateral joint line tenderness   Knee is stable to varus stress, valgus stress, Lachman, and posterior drawer.    Patella tracks centrally  Calf compartments are soft and supple  2+ DP and PT pulses with brisk capillary refill to the toes  Sural, saphenous, tibial, superficial, and deep peroneal motor and sensory distributions intact  Sensation light touch intact distally      Physical exam:  General/Constitutional: NAD, well developed, well nourished  HENT: Normocephalic, atraumatic  CV: Intact distal pulses, regular rate  Resp: No respiratory distress or labored breathing  Abdomen: soft, nondistended   Lymphatic: No lymphadenopathy palpated  Neuro: Alert and Oriented x 3, no focal " deficits  Psych: Normal mood, normal affect  Skin: Warm, dry, no rashes, no erythema  _____________________________________________________  STUDIES REVIEWED:  MRI of the left knee obtained on 5/9/2025 demonstrates a complex tear/maceration of the anterior horn and body of the lateral meniscus. Grade 1 sprain medial collateral ligament. Mild cartilage loss weightbearing portion of the lateral femoral tibial compartment. Mild grade 1 strain popliteus muscle. Small to moderate sized joint effusion.    PROCEDURES PERFORMED:  No procedures performed today.    Scribe Attestation      I,:  Radha Gan am acting as a scribe while in the presence of the attending physician.:       I,:  Ander Esposito MD personally performed the services described in this documentation    as scribed in my presence.:                    [1]   Social History  Tobacco Use    Smoking status: Never    Smokeless tobacco: Never   Vaping Use    Vaping status: Never Used   Substance Use Topics    Alcohol use: Yes     Comment: occasional    Drug use: Never   [2]   Current Outpatient Medications:     azelastine (ASTELIN) 0.1 % nasal spray, 1 spray into each nostril, Disp: , Rfl:     [employer]S Saline Nasal [place] 0.65 % nasal spray, , Disp: , Rfl:     Diclofenac Sodium (VOLTAREN) 1 %, Apply 2 g topically 4 (four) times a day, Disp: 100 g, Rfl: 0    Diclofenac Sodium (VOLTAREN) 1 %, Apply 2 g topically 3 (three) times a day for 14 days, Disp: 100 g, Rfl: 0    famotidine (PEPCID) 20 mg tablet, Take 1 tablet (20 mg total) by mouth 2 (two) times a day for 14 days, Disp: 30 tablet, Rfl: 0    fluticasone (FLONASE) 50 mcg/act nasal spray, 1 spray into each nostril, Disp: , Rfl:     ibuprofen (MOTRIN) 800 mg tablet, Take 1 tablet (800 mg total) by mouth 3 (three) times a day for 7 days, Disp: 30 tablet, Rfl: 0    Ibuprofen-Famotidine (Duexis) 800-26.6 MG TABS, Take 1 tablet by mouth 3 (three) times a day for 14 days, Disp: 30 tablet, Rfl: 0    metFORMIN  (GLUCOPHAGE) 500 mg tablet, Take 1 tablet by mouth 2 (two) times a day (Patient taking differently: Take 1 tablet by mouth in the morning and 1 tablet in the evening. PRN. AS NEEDED.), Disp: , Rfl:     mometasone (ELOCON) 0.1 % cream, , Disp: , Rfl:     OneTouch Verio test strip, , Disp: , Rfl:     triamcinolone (KENALOG) 0.1 % cream, As needed, Disp: , Rfl:     aspirin 81 mg chewable tablet, Chew 81 mg daily, Disp: , Rfl:     meloxicam (MOBIC) 7.5 mg tablet, Take 7.5 mg by mouth daily, Disp: , Rfl:     metoprolol succinate (TOPROL-XL) 25 mg 24 hr tablet, Take 1 tablet by mouth daily, Disp: , Rfl:     omeprazole (PriLOSEC) 20 mg delayed release capsule, TAKE 1 CAPSULE BY MOUTH EVERY DAY 30 MINUTES BEFORE MORNING MEAL, Disp: , Rfl:     triamcinolone (KENALOG) 0.1 % oral topical paste, Apply 1 application to teeth 2 (two) times a day, Disp: , Rfl:   [3]   Allergies  Allergen Reactions    Sulfa Antibiotics Other (See Comments), Anaphylaxis and Swelling     Other reaction(s): Other (See Comments)      Wheat Bran - Food Allergy Other (See Comments) and Swelling    Shellfish Allergy - Food Allergy Other (See Comments)

## 2025-05-20 ENCOUNTER — APPOINTMENT (OUTPATIENT)
Dept: OCCUPATIONAL THERAPY | Facility: CLINIC | Age: 70
End: 2025-05-20
Payer: MEDICARE

## 2025-05-23 DIAGNOSIS — M18.11 PRIMARY OSTEOARTHRITIS OF FIRST CARPOMETACARPAL JOINT OF RIGHT HAND: ICD-10-CM

## 2025-05-23 RX ORDER — FAMOTIDINE 20 MG/1
TABLET, FILM COATED ORAL
Qty: 180 TABLET | Refills: 1 | Status: SHIPPED | OUTPATIENT
Start: 2025-05-23

## 2025-06-17 ENCOUNTER — TELEPHONE (OUTPATIENT)
Age: 70
End: 2025-06-17

## 2025-06-17 DIAGNOSIS — M18.11 PRIMARY OSTEOARTHRITIS OF FIRST CARPOMETACARPAL JOINT OF RIGHT HAND: Primary | ICD-10-CM

## 2025-06-17 NOTE — TELEPHONE ENCOUNTER
Caller: Adma     Doctor: Dr. Jacob     Reason for call: Patient is asking for a prescription for the Voltaren gel. Patient said the OTC one is not strong enough. Please return call and advise.     Patient uses [facility] in [facility] in [place], [place]     Call back#: [phone]

## 2025-06-18 DIAGNOSIS — M18.11 PRIMARY OSTEOARTHRITIS OF FIRST CARPOMETACARPAL JOINT OF RIGHT HAND: ICD-10-CM

## 2025-06-18 RX ORDER — IBUPROFEN 800 MG/1
800 TABLET, FILM COATED ORAL 3 TIMES DAILY
Qty: 30 TABLET | Refills: 5 | Status: SHIPPED | OUTPATIENT
Start: 2025-06-18 | End: 2025-06-25

## 2025-07-03 ENCOUNTER — TELEPHONE (OUTPATIENT)
Dept: OBGYN CLINIC | Facility: CLINIC | Age: 70
End: 2025-07-03

## 2025-07-03 NOTE — TELEPHONE ENCOUNTER
JOSEPH pt to see if we can reschedule her 8/15 appt at 1030 . Pt said she will call  to reschedule.